# Patient Record
Sex: MALE | Race: WHITE | NOT HISPANIC OR LATINO | Employment: FULL TIME | RURAL
[De-identification: names, ages, dates, MRNs, and addresses within clinical notes are randomized per-mention and may not be internally consistent; named-entity substitution may affect disease eponyms.]

---

## 2021-08-23 DIAGNOSIS — Z11.59 SCREENING FOR VIRAL DISEASE: Primary | ICD-10-CM

## 2021-08-24 RX ORDER — TIZANIDINE 4 MG/1
4 TABLET ORAL EVERY 8 HOURS PRN
COMMUNITY

## 2021-08-24 RX ORDER — ESCITALOPRAM OXALATE 20 MG/1
20 TABLET ORAL DAILY
Status: ON HOLD | COMMUNITY
Start: 2021-07-20 | End: 2022-01-04

## 2021-08-24 RX ORDER — BUPRENORPHINE 15 UG/H
1 PATCH TRANSDERMAL
COMMUNITY
Start: 2021-08-19

## 2021-08-24 RX ORDER — OXYCODONE AND ACETAMINOPHEN 7.5; 325 MG/1; MG/1
1 TABLET ORAL 3 TIMES DAILY PRN
COMMUNITY
Start: 2021-08-23

## 2021-08-25 ENCOUNTER — ANESTHESIA (OUTPATIENT)
Dept: PAIN MEDICINE | Facility: HOSPITAL | Age: 46
End: 2021-08-25
Payer: COMMERCIAL

## 2021-08-25 ENCOUNTER — ANESTHESIA EVENT (OUTPATIENT)
Dept: PAIN MEDICINE | Facility: HOSPITAL | Age: 46
End: 2021-08-25
Payer: COMMERCIAL

## 2021-08-25 ENCOUNTER — HOSPITAL ENCOUNTER (OUTPATIENT)
Facility: HOSPITAL | Age: 46
Discharge: HOME | End: 2021-08-25
Attending: ANESTHESIOLOGY | Admitting: ANESTHESIOLOGY
Payer: COMMERCIAL

## 2021-08-25 VITALS
HEIGHT: 70 IN | HEART RATE: 73 BPM | HEART RATE: 80 BPM | DIASTOLIC BLOOD PRESSURE: 83 MMHG | OXYGEN SATURATION: 94 % | SYSTOLIC BLOOD PRESSURE: 136 MMHG | RESPIRATION RATE: 16 BRPM | SYSTOLIC BLOOD PRESSURE: 142 MMHG | OXYGEN SATURATION: 100 % | BODY MASS INDEX: 30.21 KG/M2 | DIASTOLIC BLOOD PRESSURE: 86 MMHG | TEMPERATURE: 99 F | WEIGHT: 211 LBS

## 2021-08-25 DIAGNOSIS — M47.816 LUMBAR SPONDYLOSIS: ICD-10-CM

## 2021-08-25 PROCEDURE — 25000003 PHARM REV CODE 250: Performed by: ANESTHESIOLOGY

## 2021-08-25 PROCEDURE — 63600175 PHARM REV CODE 636 W HCPCS: Performed by: ANESTHESIOLOGY

## 2021-08-25 PROCEDURE — 25000003 PHARM REV CODE 250: Performed by: NURSE ANESTHETIST, CERTIFIED REGISTERED

## 2021-08-25 PROCEDURE — 27201423 OPTIME MED/SURG SUP & DEVICES STERILE SUPPLY: Performed by: ANESTHESIOLOGY

## 2021-08-25 PROCEDURE — 27000716 HC OXISENSOR PROBE, ANY SIZE: Performed by: NURSE ANESTHETIST, CERTIFIED REGISTERED

## 2021-08-25 PROCEDURE — 64636 DESTROY L/S FACET JNT ADDL: CPT | Mod: 50,59,GZ | Performed by: ANESTHESIOLOGY

## 2021-08-25 PROCEDURE — 37000008 HC ANESTHESIA 1ST 15 MINUTES: Performed by: ANESTHESIOLOGY

## 2021-08-25 PROCEDURE — D9220A PRA ANESTHESIA: Mod: ,,, | Performed by: NURSE ANESTHETIST, CERTIFIED REGISTERED

## 2021-08-25 PROCEDURE — 64635 DESTROY LUMB/SAC FACET JNT: CPT | Mod: 50 | Performed by: ANESTHESIOLOGY

## 2021-08-25 PROCEDURE — 27000284 HC CANNULA NASAL: Performed by: NURSE ANESTHETIST, CERTIFIED REGISTERED

## 2021-08-25 PROCEDURE — 37000009 HC ANESTHESIA EA ADD 15 MINS: Performed by: ANESTHESIOLOGY

## 2021-08-25 PROCEDURE — 63600175 PHARM REV CODE 636 W HCPCS: Performed by: NURSE ANESTHETIST, CERTIFIED REGISTERED

## 2021-08-25 PROCEDURE — D9220A PRA ANESTHESIA: ICD-10-PCS | Mod: ,,, | Performed by: NURSE ANESTHETIST, CERTIFIED REGISTERED

## 2021-08-25 RX ORDER — ORPHENADRINE CITRATE 30 MG/ML
INJECTION INTRAMUSCULAR; INTRAVENOUS
Status: DISCONTINUED | OUTPATIENT
Start: 2021-08-25 | End: 2021-08-25

## 2021-08-25 RX ORDER — SODIUM CHLORIDE 9 MG/ML
INJECTION, SOLUTION INTRAVENOUS CONTINUOUS
Status: DISCONTINUED | OUTPATIENT
Start: 2021-08-25 | End: 2021-08-25 | Stop reason: HOSPADM

## 2021-08-25 RX ORDER — BUPIVACAINE HYDROCHLORIDE 2.5 MG/ML
INJECTION, SOLUTION INFILTRATION; PERINEURAL
Status: DISCONTINUED | OUTPATIENT
Start: 2021-08-25 | End: 2021-08-25 | Stop reason: HOSPADM

## 2021-08-25 RX ORDER — PROPOFOL 10 MG/ML
VIAL (ML) INTRAVENOUS
Status: DISCONTINUED | OUTPATIENT
Start: 2021-08-25 | End: 2021-08-25

## 2021-08-25 RX ORDER — TRIAMCINOLONE ACETONIDE 40 MG/ML
INJECTION, SUSPENSION INTRA-ARTICULAR; INTRAMUSCULAR
Status: DISCONTINUED | OUTPATIENT
Start: 2021-08-25 | End: 2021-08-25 | Stop reason: HOSPADM

## 2021-08-25 RX ORDER — LIDOCAINE HYDROCHLORIDE 20 MG/ML
INJECTION, SOLUTION EPIDURAL; INFILTRATION; INTRACAUDAL; PERINEURAL
Status: DISCONTINUED | OUTPATIENT
Start: 2021-08-25 | End: 2021-08-25

## 2021-08-25 RX ADMIN — PROPOFOL 100 MG: 10 INJECTION, EMULSION INTRAVENOUS at 08:08

## 2021-08-25 RX ADMIN — SODIUM CHLORIDE: 9 INJECTION, SOLUTION INTRAVENOUS at 08:08

## 2021-08-25 RX ADMIN — ORPHENADRINE CITRATE 60 MG: 30 INJECTION INTRAMUSCULAR; INTRAVENOUS at 08:08

## 2021-08-25 RX ADMIN — LIDOCAINE HYDROCHLORIDE 80 MG: 20 INJECTION, SOLUTION INTRAVENOUS at 08:08

## 2021-08-25 RX ADMIN — PROPOFOL 50 MG: 10 INJECTION, EMULSION INTRAVENOUS at 08:08

## 2021-11-24 ENCOUNTER — OFFICE VISIT (OUTPATIENT)
Dept: OTOLARYNGOLOGY | Facility: CLINIC | Age: 46
End: 2021-11-24
Payer: COMMERCIAL

## 2021-11-24 VITALS — WEIGHT: 211 LBS | BODY MASS INDEX: 30.21 KG/M2 | HEIGHT: 70 IN

## 2021-11-24 DIAGNOSIS — J34.2 DEVIATED SEPTUM: Primary | ICD-10-CM

## 2021-11-24 DIAGNOSIS — J32.9 CHRONIC SINUSITIS, UNSPECIFIED LOCATION: ICD-10-CM

## 2021-11-24 PROCEDURE — 99204 PR OFFICE/OUTPT VISIT, NEW, LEVL IV, 45-59 MIN: ICD-10-PCS | Mod: S$PBB,,, | Performed by: OTOLARYNGOLOGY

## 2021-11-24 PROCEDURE — 99204 OFFICE O/P NEW MOD 45 MIN: CPT | Mod: S$PBB,,, | Performed by: OTOLARYNGOLOGY

## 2021-11-24 PROCEDURE — 99213 OFFICE O/P EST LOW 20 MIN: CPT | Mod: PBBFAC | Performed by: OTOLARYNGOLOGY

## 2021-11-29 ENCOUNTER — OFFICE VISIT (OUTPATIENT)
Dept: OTOLARYNGOLOGY | Facility: CLINIC | Age: 46
End: 2021-11-29
Payer: COMMERCIAL

## 2021-11-29 ENCOUNTER — HOSPITAL ENCOUNTER (OUTPATIENT)
Dept: RADIOLOGY | Facility: HOSPITAL | Age: 46
Discharge: HOME | End: 2021-11-29
Attending: NURSE PRACTITIONER
Payer: COMMERCIAL

## 2021-11-29 VITALS — WEIGHT: 211 LBS | HEIGHT: 70 IN | BODY MASS INDEX: 30.21 KG/M2

## 2021-11-29 DIAGNOSIS — J32.9 CHRONIC SINUSITIS, UNSPECIFIED LOCATION: ICD-10-CM

## 2021-11-29 DIAGNOSIS — J34.2 DEVIATED NASAL SEPTUM: Primary | ICD-10-CM

## 2021-11-29 PROCEDURE — 99214 PR OFFICE/OUTPT VISIT, EST, LEVL IV, 30-39 MIN: ICD-10-PCS | Mod: S$PBB,,, | Performed by: OTOLARYNGOLOGY

## 2021-11-29 PROCEDURE — 70486 CT SINUSES WITHOUT CONTRAST: ICD-10-PCS | Mod: 26,,, | Performed by: RADIOLOGY

## 2021-11-29 PROCEDURE — 70486 CT MAXILLOFACIAL W/O DYE: CPT | Mod: TC

## 2021-11-29 PROCEDURE — 99213 OFFICE O/P EST LOW 20 MIN: CPT | Mod: PBBFAC,25 | Performed by: OTOLARYNGOLOGY

## 2021-11-29 PROCEDURE — 99214 OFFICE O/P EST MOD 30 MIN: CPT | Mod: S$PBB,,, | Performed by: OTOLARYNGOLOGY

## 2021-11-29 PROCEDURE — 70486 CT MAXILLOFACIAL W/O DYE: CPT | Mod: 26,,, | Performed by: RADIOLOGY

## 2021-12-28 ENCOUNTER — TELEPHONE (OUTPATIENT)
Dept: OTOLARYNGOLOGY | Facility: CLINIC | Age: 46
End: 2021-12-28
Payer: COMMERCIAL

## 2022-01-03 RX ORDER — SERTRALINE HYDROCHLORIDE 50 MG/1
50 TABLET, FILM COATED ORAL DAILY
COMMUNITY
Start: 2021-10-21

## 2022-01-03 RX ORDER — TESTOSTERONE CYPIONATE 1000 MG/10ML
1000 INJECTION, SOLUTION INTRAMUSCULAR WEEKLY
COMMUNITY
Start: 2021-10-27

## 2022-01-03 RX ORDER — BUSPIRONE HYDROCHLORIDE 7.5 MG/1
7.5 TABLET ORAL 2 TIMES DAILY
COMMUNITY
Start: 2021-10-21

## 2022-01-03 RX ORDER — ASPIRIN 325 MG
TABLET, DELAYED RELEASE (ENTERIC COATED) ORAL WEEKLY
COMMUNITY
Start: 2021-10-27

## 2022-01-03 RX ORDER — ROSUVASTATIN CALCIUM 10 MG/1
10 TABLET, COATED ORAL NIGHTLY
COMMUNITY
Start: 2021-10-27

## 2022-01-04 ENCOUNTER — ANESTHESIA EVENT (OUTPATIENT)
Dept: SURGERY | Facility: HOSPITAL | Age: 47
End: 2022-01-04
Payer: COMMERCIAL

## 2022-01-04 ENCOUNTER — ANESTHESIA (OUTPATIENT)
Dept: SURGERY | Facility: HOSPITAL | Age: 47
End: 2022-01-04
Payer: COMMERCIAL

## 2022-01-04 ENCOUNTER — HOSPITAL ENCOUNTER (OUTPATIENT)
Facility: HOSPITAL | Age: 47
Discharge: HOME | End: 2022-01-04
Attending: OTOLARYNGOLOGY | Admitting: OTOLARYNGOLOGY
Payer: COMMERCIAL

## 2022-01-04 VITALS
SYSTOLIC BLOOD PRESSURE: 141 MMHG | BODY MASS INDEX: 30.35 KG/M2 | HEART RATE: 87 BPM | DIASTOLIC BLOOD PRESSURE: 88 MMHG | OXYGEN SATURATION: 95 % | WEIGHT: 212 LBS | RESPIRATION RATE: 16 BRPM | HEIGHT: 70 IN | TEMPERATURE: 98 F

## 2022-01-04 DIAGNOSIS — S02.2XXA NASAL BONE FRACTURE: ICD-10-CM

## 2022-01-04 DIAGNOSIS — I10 HYPERTENSION: ICD-10-CM

## 2022-01-04 DIAGNOSIS — J34.2 DEVIATED SEPTUM: Primary | ICD-10-CM

## 2022-01-04 PROCEDURE — 27000716 HC OXISENSOR PROBE, ANY SIZE: Performed by: ANESTHESIOLOGY

## 2022-01-04 PROCEDURE — 27000260 *HC AIRWAY ORAL: Performed by: ANESTHESIOLOGY

## 2022-01-04 PROCEDURE — 93010 EKG 12-LEAD: ICD-10-PCS | Mod: ,,, | Performed by: HOSPITALIST

## 2022-01-04 PROCEDURE — 71000033 HC RECOVERY, INTIAL HOUR: Performed by: OTOLARYNGOLOGY

## 2022-01-04 PROCEDURE — 37000008 HC ANESTHESIA 1ST 15 MINUTES: Performed by: OTOLARYNGOLOGY

## 2022-01-04 PROCEDURE — 93010 ELECTROCARDIOGRAM REPORT: CPT | Mod: ,,, | Performed by: HOSPITALIST

## 2022-01-04 PROCEDURE — D9220A PRA ANESTHESIA: ICD-10-PCS | Mod: CRNA,,, | Performed by: NURSE ANESTHETIST, CERTIFIED REGISTERED

## 2022-01-04 PROCEDURE — 27000655: Performed by: ANESTHESIOLOGY

## 2022-01-04 PROCEDURE — 71000016 HC POSTOP RECOV ADDL HR: Performed by: OTOLARYNGOLOGY

## 2022-01-04 PROCEDURE — D9220A PRA ANESTHESIA: Mod: ANES,,, | Performed by: ANESTHESIOLOGY

## 2022-01-04 PROCEDURE — 37000009 HC ANESTHESIA EA ADD 15 MINS: Performed by: OTOLARYNGOLOGY

## 2022-01-04 PROCEDURE — D9220A PRA ANESTHESIA: Mod: CRNA,,, | Performed by: NURSE ANESTHETIST, CERTIFIED REGISTERED

## 2022-01-04 PROCEDURE — 27201423 OPTIME MED/SURG SUP & DEVICES STERILE SUPPLY: Performed by: OTOLARYNGOLOGY

## 2022-01-04 PROCEDURE — 36000707: Performed by: OTOLARYNGOLOGY

## 2022-01-04 PROCEDURE — D9220A PRA ANESTHESIA: ICD-10-PCS | Mod: ANES,,, | Performed by: ANESTHESIOLOGY

## 2022-01-04 PROCEDURE — 63600175 PHARM REV CODE 636 W HCPCS: Performed by: NURSE ANESTHETIST, CERTIFIED REGISTERED

## 2022-01-04 PROCEDURE — 27000165 HC TUBE, ETT CUFFED: Performed by: ANESTHESIOLOGY

## 2022-01-04 PROCEDURE — 30520 PR REPAIR, NASAL SEPTUM: ICD-10-PCS | Mod: ,,, | Performed by: OTOLARYNGOLOGY

## 2022-01-04 PROCEDURE — 25000003 PHARM REV CODE 250: Performed by: NURSE ANESTHETIST, CERTIFIED REGISTERED

## 2022-01-04 PROCEDURE — 36000706: Performed by: OTOLARYNGOLOGY

## 2022-01-04 PROCEDURE — 27000510 HC BLANKET BAIR HUGGER ANY SIZE: Performed by: ANESTHESIOLOGY

## 2022-01-04 PROCEDURE — 71000015 HC POSTOP RECOV 1ST HR: Performed by: OTOLARYNGOLOGY

## 2022-01-04 PROCEDURE — 93005 ELECTROCARDIOGRAM TRACING: CPT

## 2022-01-04 PROCEDURE — 63600175 PHARM REV CODE 636 W HCPCS: Performed by: ANESTHESIOLOGY

## 2022-01-04 PROCEDURE — 27000689 HC BLADE LARYNGOSCOPE ANY SIZE: Performed by: ANESTHESIOLOGY

## 2022-01-04 PROCEDURE — 30520 REPAIR OF NASAL SEPTUM: CPT | Mod: ,,, | Performed by: OTOLARYNGOLOGY

## 2022-01-04 PROCEDURE — 25000003 PHARM REV CODE 250: Performed by: OTOLARYNGOLOGY

## 2022-01-04 RX ORDER — LIDOCAINE HYDROCHLORIDE AND EPINEPHRINE 10; 10 MG/ML; UG/ML
INJECTION, SOLUTION INFILTRATION; PERINEURAL
Status: DISCONTINUED | OUTPATIENT
Start: 2022-01-04 | End: 2022-01-04 | Stop reason: HOSPADM

## 2022-01-04 RX ORDER — PROPOFOL 10 MG/ML
VIAL (ML) INTRAVENOUS
Status: DISCONTINUED | OUTPATIENT
Start: 2022-01-04 | End: 2022-01-04

## 2022-01-04 RX ORDER — FENTANYL CITRATE 50 UG/ML
INJECTION, SOLUTION INTRAMUSCULAR; INTRAVENOUS
Status: DISCONTINUED | OUTPATIENT
Start: 2022-01-04 | End: 2022-01-04

## 2022-01-04 RX ORDER — HYDROMORPHONE HYDROCHLORIDE 2 MG/ML
0.5 INJECTION, SOLUTION INTRAMUSCULAR; INTRAVENOUS; SUBCUTANEOUS EVERY 5 MIN PRN
Status: DISCONTINUED | OUTPATIENT
Start: 2022-01-04 | End: 2022-01-04 | Stop reason: HOSPADM

## 2022-01-04 RX ORDER — MORPHINE SULFATE 10 MG/ML
4 INJECTION INTRAMUSCULAR; INTRAVENOUS; SUBCUTANEOUS EVERY 5 MIN PRN
Status: DISCONTINUED | OUTPATIENT
Start: 2022-01-04 | End: 2022-01-04 | Stop reason: HOSPADM

## 2022-01-04 RX ORDER — ONDANSETRON 2 MG/ML
INJECTION INTRAMUSCULAR; INTRAVENOUS
Status: DISCONTINUED | OUTPATIENT
Start: 2022-01-04 | End: 2022-01-04

## 2022-01-04 RX ORDER — HYDROCODONE BITARTRATE AND ACETAMINOPHEN 7.5; 325 MG/1; MG/1
1 TABLET ORAL EVERY 6 HOURS PRN
Status: DISCONTINUED | OUTPATIENT
Start: 2022-01-04 | End: 2022-01-04 | Stop reason: HOSPADM

## 2022-01-04 RX ORDER — LIDOCAINE HYDROCHLORIDE 10 MG/ML
1 INJECTION INFILTRATION; PERINEURAL ONCE
Status: DISCONTINUED | OUTPATIENT
Start: 2022-01-04 | End: 2022-01-04 | Stop reason: HOSPADM

## 2022-01-04 RX ORDER — LIDOCAINE HYDROCHLORIDE 10 MG/ML
1 INJECTION INFILTRATION; PERINEURAL ONCE AS NEEDED
Status: DISCONTINUED | OUTPATIENT
Start: 2022-01-04 | End: 2022-01-04 | Stop reason: HOSPADM

## 2022-01-04 RX ORDER — MIDAZOLAM HYDROCHLORIDE 1 MG/ML
INJECTION INTRAMUSCULAR; INTRAVENOUS
Status: DISCONTINUED | OUTPATIENT
Start: 2022-01-04 | End: 2022-01-04

## 2022-01-04 RX ORDER — DEXAMETHASONE SODIUM PHOSPHATE 4 MG/ML
INJECTION, SOLUTION INTRA-ARTICULAR; INTRALESIONAL; INTRAMUSCULAR; INTRAVENOUS; SOFT TISSUE
Status: DISCONTINUED | OUTPATIENT
Start: 2022-01-04 | End: 2022-01-04

## 2022-01-04 RX ORDER — LIDOCAINE HYDROCHLORIDE 20 MG/ML
INJECTION, SOLUTION EPIDURAL; INFILTRATION; INTRACAUDAL; PERINEURAL
Status: DISCONTINUED | OUTPATIENT
Start: 2022-01-04 | End: 2022-01-04

## 2022-01-04 RX ORDER — MEPERIDINE HYDROCHLORIDE 25 MG/ML
25 INJECTION INTRAMUSCULAR; INTRAVENOUS; SUBCUTANEOUS EVERY 10 MIN PRN
Status: DISCONTINUED | OUTPATIENT
Start: 2022-01-04 | End: 2022-01-04 | Stop reason: HOSPADM

## 2022-01-04 RX ORDER — ROCURONIUM BROMIDE 10 MG/ML
INJECTION, SOLUTION INTRAVENOUS
Status: DISCONTINUED | OUTPATIENT
Start: 2022-01-04 | End: 2022-01-04

## 2022-01-04 RX ORDER — DIPHENHYDRAMINE HYDROCHLORIDE 50 MG/ML
25 INJECTION INTRAMUSCULAR; INTRAVENOUS EVERY 6 HOURS PRN
Status: DISCONTINUED | OUTPATIENT
Start: 2022-01-04 | End: 2022-01-04 | Stop reason: HOSPADM

## 2022-01-04 RX ORDER — ONDANSETRON 2 MG/ML
4 INJECTION INTRAMUSCULAR; INTRAVENOUS DAILY PRN
Status: DISCONTINUED | OUTPATIENT
Start: 2022-01-04 | End: 2022-01-04 | Stop reason: HOSPADM

## 2022-01-04 RX ORDER — SODIUM CHLORIDE, SODIUM LACTATE, POTASSIUM CHLORIDE, CALCIUM CHLORIDE 600; 310; 30; 20 MG/100ML; MG/100ML; MG/100ML; MG/100ML
INJECTION, SOLUTION INTRAVENOUS CONTINUOUS
Status: DISCONTINUED | OUTPATIENT
Start: 2022-01-04 | End: 2022-01-04 | Stop reason: HOSPADM

## 2022-01-04 RX ADMIN — DEXAMETHASONE SODIUM PHOSPHATE 16 MG: 4 INJECTION, SOLUTION INTRA-ARTICULAR; INTRALESIONAL; INTRAMUSCULAR; INTRAVENOUS; SOFT TISSUE at 09:01

## 2022-01-04 RX ADMIN — SODIUM CHLORIDE, POTASSIUM CHLORIDE, SODIUM LACTATE AND CALCIUM CHLORIDE: 600; 310; 30; 20 INJECTION, SOLUTION INTRAVENOUS at 09:01

## 2022-01-04 RX ADMIN — ROCURONIUM BROMIDE 35 MG: 10 INJECTION, SOLUTION INTRAVENOUS at 09:01

## 2022-01-04 RX ADMIN — MIDAZOLAM 2 MG: 1 INJECTION INTRAMUSCULAR; INTRAVENOUS at 09:01

## 2022-01-04 RX ADMIN — SUGAMMADEX 200 MG: 100 INJECTION, SOLUTION INTRAVENOUS at 09:01

## 2022-01-04 RX ADMIN — PROPOFOL 155 MG: 10 INJECTION, EMULSION INTRAVENOUS at 09:01

## 2022-01-04 RX ADMIN — ONDANSETRON 4 MG: 2 INJECTION INTRAMUSCULAR; INTRAVENOUS at 09:01

## 2022-01-04 RX ADMIN — FENTANYL CITRATE 100 MCG: 50 INJECTION INTRAMUSCULAR; INTRAVENOUS at 09:01

## 2022-01-04 RX ADMIN — LIDOCAINE HYDROCHLORIDE 100 MG: 20 INJECTION, SOLUTION INTRAVENOUS at 09:01

## 2022-01-04 NOTE — OP NOTE
After general ET anesthesia the patient was decongested with neosynephrine soaked patties also the septum was injected with lidocaine 1% with 1:100,000 epi around 3 cc a right meron transfixion incision was made on the right side and a mucoperichondral flap was raised the deviated portions of the cartilage were removed leaving adequate support septal cohaptation sutures were placed with 3 0 Chromic and closing also the incision   There was no further bleeding the patient was reversed taken to  in stable condition.

## 2022-01-04 NOTE — ANESTHESIA PROCEDURE NOTES
Intubation    Date/Time: 1/4/2022 9:20 AM  Performed by: Ismael Arnold CRNA  Authorized by: Ricardo Chicas MD     Intubation:     Induction:  Intravenous    Intubated:  Postinduction    Mask Ventilation:  Easy mask    Attempts:  1    Attempted By:  CRNA    Method of Intubation:  Direct    Blade:  Carolyn 4    Laryngeal View Grade: Grade I - full view of cords      Difficult Airway Encountered?: No      Complications:  None    Airway Device:  Oral endotracheal tube    Airway Device Size:  7.0    Style/Cuff Inflation:  Cuffed    Inflation Amount (mL):  7    Tube secured:  21    Placement Verified By:  Capnometry    Complicating Factors:  None    Findings Post-Intubation:  BS equal bilateral and atraumatic/condition of teeth unchanged

## 2022-01-04 NOTE — H&P
Patient ID: Roberto Rubio is a 46 y.o. male.     Chief Complaint: Follow-up (CT sinus. Septal deviation)     HPI  Review of Systems   HENT: Positive for congestion, sinus pressure and sinus pain.    All other systems reviewed and are negative.      Objective:   Physical Exam  General: NAD  Head: Normocephalic, atraumatic, no facial asymmetry/normal strength,  Ears: Both auricules normal in appearance, w/o deformities tympanic membranes normal external auditory canals normal  Nose: External nose w/o deformities normal turbinates no drainage or inflammation septum to left  With spur on right posteriorly  Oral Cavity: Lips, gums, floor of mouth, tongue hard palate, and buccal mucosa without mass/lesion  Oropharynx: Mucosa pink and moist, soft palate, posterior pharynx and oropharyngeal wall without mass/lesion  Neck: Supple, symmetric, trachea midline, no palpable mass/lesion, no palpable cervical lymphadenopathy  Skin: Warm and dry, no concerning lesions  Respiratory: Respirations even, unlabored     Assessment:       1. Deviated nasal septum        Plan:     discussed sinus CT with pt   Septoplasty

## 2022-01-04 NOTE — ANESTHESIA POSTPROCEDURE EVALUATION
Anesthesia Post Evaluation    Patient: Roberto Rubio    Procedure(s) Performed: Procedure(s) (LRB):  SEPTOPLASTY, NOSE (N/A)    Final Anesthesia Type: general      Patient location during evaluation: PACU  Patient participation: Yes- Able to Participate  Level of consciousness: awake  Post-procedure vital signs: reviewed and stable  Pain management: adequate  Airway patency: patent  HARDY mitigation strategies: Extubation and recovery carried out in lateral, semiupright, or other nonsupine position  PONV status at discharge: No PONV  Anesthetic complications: no      Cardiovascular status: hemodynamically stable  Respiratory status: unassisted  Hydration status: euvolemic  Follow-up not needed.          Vitals Value Taken Time   /84 01/04/22 1019   Temp 36.1 °C (97 °F) 01/04/22 0949   Pulse 80 01/04/22 1024   Resp 14 01/04/22 1024   SpO2 94 % 01/04/22 1024   Vitals shown include unvalidated device data.      No case tracking events are documented in the log.      Pain/Mejia Score: Mejia Score: 10 (1/4/2022 10:21 AM)

## 2022-01-04 NOTE — TRANSFER OF CARE
"Anesthesia Transfer of Care Note    Patient: Roberto Rubio    Procedure(s) Performed: Procedure(s) (LRB):  SEPTOPLASTY, NOSE (N/A)    Patient location: PACU    Anesthesia Type: general    Transport from OR: Transported from OR on 6-10 L/min O2 by face mask with adequate spontaneous ventilation    Post pain: adequate analgesia    Post assessment: no apparent anesthetic complications    Post vital signs: stable    Level of consciousness: sedated and awake    Nausea/Vomiting: no nausea/vomiting    Complications: none    Transfer of care protocol was followed      Last vitals:   Visit Vitals  /72 (BP Location: Right arm, Patient Position: Lying)   Pulse 89   Temp 36.1 °C (97 °F)   Resp 15   Ht 5' 10" (1.778 m)   Wt 96.2 kg (212 lb)   SpO2 (!) 93%   BMI 30.42 kg/m²     "

## 2022-01-04 NOTE — ANESTHESIA PREPROCEDURE EVALUATION
01/04/2022  Roberto Rubio is a 46 y.o., male.    Anesthesia Evaluation    I have reviewed the Patient Summary Reports.   I have reviewed the NPO Status.   I have reviewed the Medications.     Review of Systems         Anesthesia Plan  Type of Anesthesia, risks & benefits discussed:  Anesthesia Type:  general    Patient's Preference:   Plan Factors:          Intra-op Monitoring Plan: standard ASA monitors  Intra-op Monitoring Plan Comments:   Post Op Pain Control Plan: IV/PO Opioids PRN  Post Op Pain Control Plan Comments:     Induction:   IV  Beta Blocker:         Informed Consent: Patient understands risks and agrees with Anesthesia plan.  Questions answered. Anesthesia consent signed with patient.  ASA Score: 2     Day of Surgery Review of History & Physical:            Ready For Surgery From Anesthesia Perspective.     NPO greater than 8 hours  NAC  NKDA    Depression Hypertension   Arthritis    H/o GI ulcer    Airway exam deferred (COVID precautions); adequate ROM at neck.

## 2022-01-04 NOTE — PROGRESS NOTES
C/O [facility] PAIN STATES #5. REQUEST PAIN MED STATING I HAVE MY OWN MEDICATION FROM PAIN [facility] AND I'LL TAKE IT. PERCOCET 7.5MG 1 PO TAKEN FROM HOME SUPPLY.

## 2022-01-04 NOTE — OR NURSING
Pt. Transferred back to [facility] via stretcher. Color pink. Resp. Easy. Head of stretcher elevated. No bleeding or drainage noted from nose or mouth. Awake and alert. Iv of lr at kvo in rt. Hand without problems. No reports fo pain. V/s stable. Condition stable.

## 2022-01-13 ENCOUNTER — OFFICE VISIT (OUTPATIENT)
Dept: OTOLARYNGOLOGY | Facility: CLINIC | Age: 47
End: 2022-01-13
Payer: COMMERCIAL

## 2022-01-13 VITALS — HEIGHT: 70 IN | WEIGHT: 212 LBS | BODY MASS INDEX: 30.35 KG/M2

## 2022-01-13 DIAGNOSIS — J34.2 DEVIATED NASAL SEPTUM: Primary | ICD-10-CM

## 2022-01-13 PROCEDURE — 3008F BODY MASS INDEX DOCD: CPT | Mod: ,,, | Performed by: OTOLARYNGOLOGY

## 2022-01-13 PROCEDURE — 1160F PR REVIEW ALL MEDS BY PRESCRIBER/CLIN PHARMACIST DOCUMENTED: ICD-10-PCS | Mod: ,,, | Performed by: OTOLARYNGOLOGY

## 2022-01-13 PROCEDURE — 3008F PR BODY MASS INDEX (BMI) DOCUMENTED: ICD-10-PCS | Mod: ,,, | Performed by: OTOLARYNGOLOGY

## 2022-01-13 PROCEDURE — 1160F RVW MEDS BY RX/DR IN RCRD: CPT | Mod: ,,, | Performed by: OTOLARYNGOLOGY

## 2022-01-13 PROCEDURE — 1159F PR MEDICATION LIST DOCUMENTED IN MEDICAL RECORD: ICD-10-PCS | Mod: ,,, | Performed by: OTOLARYNGOLOGY

## 2022-01-13 PROCEDURE — 99213 OFFICE O/P EST LOW 20 MIN: CPT | Mod: PBBFAC | Performed by: OTOLARYNGOLOGY

## 2022-01-13 PROCEDURE — 1159F MED LIST DOCD IN RCRD: CPT | Mod: ,,, | Performed by: OTOLARYNGOLOGY

## 2022-01-13 PROCEDURE — 99024 POSTOP FOLLOW-UP VISIT: CPT | Mod: ,,, | Performed by: OTOLARYNGOLOGY

## 2022-01-13 PROCEDURE — 99024 PR POST-OP FOLLOW-UP VISIT: ICD-10-PCS | Mod: ,,, | Performed by: OTOLARYNGOLOGY

## 2022-01-13 NOTE — PROGRESS NOTES
Subjective:       Patient ID: Roberto Rubio is a 46 y.o. male.    Chief Complaint: Follow-up (10 day post-op septoplasty. Pt states nose and top of mouth is tender to touch. )    HPI  Review of Systems    Objective:      Physical Exam  healing well smell improved   Assessment:       1. Deviated nasal septum        Plan:       F/u 6 weeks

## 2022-03-21 ENCOUNTER — OFFICE VISIT (OUTPATIENT)
Dept: DERMATOLOGY | Facility: CLINIC | Age: 47
End: 2022-03-21
Payer: COMMERCIAL

## 2022-03-21 DIAGNOSIS — D22.9 MULTIPLE BENIGN NEVI: ICD-10-CM

## 2022-03-21 DIAGNOSIS — L82.1 SEBORRHEIC KERATOSES: ICD-10-CM

## 2022-03-21 DIAGNOSIS — L57.8 OTHER SKIN CHANGES DUE TO CHRONIC EXPOSURE TO NONIONIZING RADIATION: Primary | ICD-10-CM

## 2022-03-21 DIAGNOSIS — Z85.820 HISTORY OF MELANOMA: ICD-10-CM

## 2022-03-21 DIAGNOSIS — H02.60 XANTHELASMA: ICD-10-CM

## 2022-03-21 PROCEDURE — 1160F RVW MEDS BY RX/DR IN RCRD: CPT | Mod: ,,, | Performed by: DERMATOLOGY

## 2022-03-21 PROCEDURE — 1160F PR REVIEW ALL MEDS BY PRESCRIBER/CLIN PHARMACIST DOCUMENTED: ICD-10-PCS | Mod: ,,, | Performed by: DERMATOLOGY

## 2022-03-21 PROCEDURE — 1159F MED LIST DOCD IN RCRD: CPT | Mod: ,,, | Performed by: DERMATOLOGY

## 2022-03-21 PROCEDURE — 99203 PR OFFICE/OUTPT VISIT, NEW, LEVL III, 30-44 MIN: ICD-10-PCS | Mod: ,,, | Performed by: DERMATOLOGY

## 2022-03-21 PROCEDURE — 99203 OFFICE O/P NEW LOW 30 MIN: CPT | Mod: ,,, | Performed by: DERMATOLOGY

## 2022-03-21 PROCEDURE — 1159F PR MEDICATION LIST DOCUMENTED IN MEDICAL RECORD: ICD-10-PCS | Mod: ,,, | Performed by: DERMATOLOGY

## 2022-03-21 NOTE — PROGRESS NOTES
[facility]   Sadie Rodríguez MD    Patient Name: Roberto Rubio  Patient YOB: 1975   Date of Service: 3/21/22    CC: Full Skin Exam    HPI: Roberto Rubio is a 46 y.o. male presents today for a full skin exam.  Patient has been seen by a dermatologist in the past and dermatologic history includes hx of melanoma on the middle back in 2005. Patient is concerned today about lesions  located on the back.  It has been present for 1 year(s). It has not been treated in the past. Patient is also concerned about lesion on the left upper eyelid.    Past Medical History:   Diagnosis Date    Arthritis     Depression     Hypertension      Past Surgical History:   Procedure Laterality Date    CARPAL TUNNEL RELEASE Bilateral     NASAL SEPTOPLASTY N/A 1/4/2022    Procedure: SEPTOPLASTY, NOSE;  Surgeon: Amado Maya MD;  Location: [facility] OR;  Service: ENT;  Laterality: N/A;    RADIOFREQUENCY ABLATION OF LUMBAR MEDIAL BRANCH NERVE AT SINGLE LEVEL Bilateral 8/25/2021    Procedure: RADIOFREQUENCY ABLATION, NERVE, SPINAL, LUMBAR, MEDIAL BRANCH, 1 LEVEL;  Surgeon: Ricardo Boss MD;  Location: [facility]IN [facility];  Service: Pain Management;  Laterality: Bilateral;  Bilateral  L3-5 RFTC     Review of patient's allergies indicates:   Allergen Reactions    Nsaids (non-steroidal anti-inflammatory drug)        Current Outpatient Medications:     buprenorphine 15 mcg/hour PTWK, Apply 1 patch topically every 7 days., Disp: , Rfl:     busPIRone (BUSPAR) 7.5 MG tablet, 7.5 mg 2 (two) times a day., Disp: , Rfl:     cholecalciferol, vitamin D3, 1,250 mcg (50,000 unit) capsule, Take by mouth once a week., Disp: , Rfl:     oxyCODONE-acetaminophen (PERCOCET) 7.5-325 mg per tablet, Take 1 tablet by mouth 3 (three) times daily as needed. , Disp: , Rfl:     rosuvastatin (CRESTOR) 10 MG tablet, 10 mg every evening., Disp: , Rfl:     sertraline (ZOLOFT) 50 MG tablet, 50 mg once daily., Disp: , Rfl:      testosterone cypionate (DEPOTESTOTERONE CYPIONATE) 100 mg/mL injection, Inject 1,000 mg into the muscle once a week., Disp: , Rfl:     tiZANidine (ZANAFLEX) 4 MG tablet, Take 4 mg by mouth every 8 (eight) hours as needed., Disp: , Rfl:     ROS: A focused review of systems was obtained and negative.     Exam: A full skin exam was performed including scalp, hair, face, neck, chest, back, abdomen, right arm, left arm, right hand, left hand, nails, right leg, and left leg.  All areas examined were normal expect as per below in assessment and plan.  General Appearance of the patient is well developed and well nourished.  Orientation: alert and oriented x 3.  Mood and affect: pleasant.    Assessment:   The primary encounter diagnosis was Other skin changes due to chronic exposure to nonionizing radiation. Diagnoses of History of melanoma, Multiple benign nevi, and Seborrheic keratoses were also pertinent to this visit.    Plan:      History of malignant melanoma  - well healed scar with NER  Associated diagnosis: Medical surveillance following completed treatment    Plan: Counseling  I counseled the patient regarding the following:  Skin Care: Patients with a history of melanoma should wear broad spectrum sunscreen and sun protective clothing.  Expectations: Scars from excisional sites of melanoma should be monitored for any recurrences. Monthly self-skin checks should be performed to monitor for any moles that change in size, shape or color, itch burn or bleed.  Contact Office if: Patient notices any new or changing moles, develops constitutional symptoms or develops new lesions within or around the previous melanoma scar.    Other Skin Changes Due to Chronic Exposure of Nonionizing Radiation (L57.8)    Plan: Monitoring.     Plan: Sunscreen Recommendations.  I recommended a broad spectrum sunscreen with a SPF of 30 or higher. I explained that SPF 30 sunscreens block approximately 97 percent of the  sun's harmful rays.  Sunscreens should be applied at least 15 minutes prior to expected sun exposure and then every 2 hours after that as long as  sun exposure continues. If swimming or exercising sunscreen should be reapplied every 45 minutes to an hour after getting wet or sweating. One  ounce, or the equivalent of a shot glass full of sunscreen, is adequate to protect the skin not covered by a bathing suit. I also recommended a lip  balm with a sunscreen as well. Sun protective clothing can be used in lieu of sunscreen but must be worn the entire time you are exposed to the  sun's rays.    Benign Nevus (D22.72)  - Dome shaped regular papule    Plan: Reassurance.    Plan: Counseling.  I counseled the patient regarding the following:  Instructions: Monthly self-skin checks to monitor for any changes in moles are recommended.  Expectations: Benign Nevi are pigmented nests of cells within the skin. No treatment is necessary.  Contact Office if: Any moles change in size, shape or color; itch, burn or bleed.    Seborrheic Keratosis (L82.1)  - Stuck-on, warty, greasy brown papule with pseudo-horn cysts scattered on the trunk and extremities    Plan: Counseling.  I counseled the patient regarding the following:  Skin Care: Seborrheic Keratoses are benign. No treatment is necessary.  Expectations: Seborrheic Keratoses are benign warty growths. Patients get more of them as they age    Plan: Reassurance    Xanthelasma   - yellow plaque on the left upper eyelid    Reassurance     Follow up in about 1 year (around 3/21/2023).    Sadie Rodríguez MD

## 2023-05-04 NOTE — OR NURSING
0947- received pt. To pacu via stretcher. Color pink. Resp. Easy. Head of stretcher elevated. Awake and talking. Iv fluids of lr at kvo in right hand without problems. No reports of pain. Connected to monitors.     1000- pt. Resting quietly on stretcher. Eyes closed. Resp. Easy. Continuing to monitor.       24

## 2023-05-17 ENCOUNTER — OFFICE VISIT (OUTPATIENT)
Dept: DERMATOLOGY | Facility: CLINIC | Age: 48
End: 2023-05-17
Payer: COMMERCIAL

## 2023-05-17 DIAGNOSIS — D22.9 BENIGN NEVUS: ICD-10-CM

## 2023-05-17 DIAGNOSIS — L57.8 OTHER SKIN CHANGES DUE TO CHRONIC EXPOSURE TO NONIONIZING RADIATION: Primary | ICD-10-CM

## 2023-05-17 DIAGNOSIS — Z85.820 HISTORY OF MELANOMA: ICD-10-CM

## 2023-05-17 DIAGNOSIS — L82.1 SK (SEBORRHEIC KERATOSIS): ICD-10-CM

## 2023-05-17 PROCEDURE — 1159F PR MEDICATION LIST DOCUMENTED IN MEDICAL RECORD: ICD-10-PCS | Mod: ,,, | Performed by: DERMATOLOGY

## 2023-05-17 PROCEDURE — 1160F PR REVIEW ALL MEDS BY PRESCRIBER/CLIN PHARMACIST DOCUMENTED: ICD-10-PCS | Mod: ,,, | Performed by: DERMATOLOGY

## 2023-05-17 PROCEDURE — 1160F RVW MEDS BY RX/DR IN RCRD: CPT | Mod: ,,, | Performed by: DERMATOLOGY

## 2023-05-17 PROCEDURE — 1159F MED LIST DOCD IN RCRD: CPT | Mod: ,,, | Performed by: DERMATOLOGY

## 2023-05-17 PROCEDURE — 99213 PR OFFICE/OUTPT VISIT, EST, LEVL III, 20-29 MIN: ICD-10-PCS | Mod: ,,, | Performed by: DERMATOLOGY

## 2023-05-17 PROCEDURE — 99213 OFFICE O/P EST LOW 20 MIN: CPT | Mod: ,,, | Performed by: DERMATOLOGY

## 2023-05-17 NOTE — PATIENT INSTRUCTIONS
Sunscreen Recommendations  I recommended a broad spectrum sunscreen with a SPF of 30 or higher that is water-resistant. SPF 30 sunscreens block approximately 97 percent of the sun's harmful rays.   Sunscreens should be applied at least 15 minutes prior to expected sun exposure and then every 90 minutes after that as long as sun exposure continues.   If swimming or exercising sunscreen should be reapplied every 45 minutes to an hour after getting wet or sweating.  One ounce, or the equivalent of a shot glass full of sunscreen, is adequate to protect the skin not covered by a bathing suit.   I also recommended a lip balm with a sunscreen as well.   Healthy Sun Protective Behaviors  Sun protective clothing can be used in lieu of sunscreen but must be worn the entire time you are exposed to the sun's rays.  Seek shade between 10 a.m. and 2 p.m.  Use extra caution near water, snow, or sand as they reflect sun rays  Avoid tanning beds and consider sunless self-tanning products instead  Perform monthly self skin exams    The ABCDEs of Melanoma  Asymmetry - when one side is unlike the other  Border - irregular  Color - different shades of colors that can be black, brown, tan, white, red, grey, or blue  Diameter - as big as or larger than the size of a pencil eraser (6mm)  Evolving - changing in size, color, or shape or stands out from the rest of your moles

## 2023-05-17 NOTE — PROGRESS NOTES
[facility]   Sadie Rodríguez MD    Patient Name: Roberto Rubio  Patient YOB: 1975   Date of Service: 5/17/23    CC: Full Skin Exam    HPI: Roberto Rubio is a 48 y.o. male presents today for a full skin exam.  Patient was last seen 03/22 and dermatologic history includes Melanoma. Patient is concerned today about a lesion located on the right posterior shoulder.  It has been present for 2 year(s). It has not been treated in the past.    Past Medical History:   Diagnosis Date    Arthritis     Depression     Hypertension     Malignant melanoma of skin, unspecified     Right interscapular back- 10/2005 0.25mm     Past Surgical History:   Procedure Laterality Date    CARPAL TUNNEL RELEASE Bilateral     NASAL SEPTOPLASTY N/A 1/4/2022    Procedure: SEPTOPLASTY, NOSE;  Surgeon: Amado Maya MD;  Location: [facility] OR;  Service: ENT;  Laterality: N/A;    RADIOFREQUENCY ABLATION OF LUMBAR MEDIAL BRANCH NERVE AT SINGLE LEVEL Bilateral 8/25/2021    Procedure: RADIOFREQUENCY ABLATION, NERVE, SPINAL, LUMBAR, MEDIAL BRANCH, 1 LEVEL;  Surgeon: Ricardo Boss MD;  Location: [facility];  Service: Pain Management;  Laterality: Bilateral;  Bilateral  L3-5 RFTC     Review of patient's allergies indicates:   Allergen Reactions    Nsaids (non-steroidal anti-inflammatory drug)        Current Outpatient Medications:     buprenorphine 15 mcg/hour PTWK, Apply 1 patch topically every 7 days., Disp: , Rfl:     busPIRone (BUSPAR) 7.5 MG tablet, 7.5 mg 2 (two) times a day., Disp: , Rfl:     cholecalciferol, vitamin D3, 1,250 mcg (50,000 unit) capsule, Take by mouth once a week., Disp: , Rfl:     oxyCODONE-acetaminophen (PERCOCET) 7.5-325 mg per tablet, Take 1 tablet by mouth 3 (three) times daily as needed. , Disp: , Rfl:     rosuvastatin (CRESTOR) 10 MG tablet, 10 mg every evening., Disp: , Rfl:     sertraline (ZOLOFT) 50 MG tablet, 50 mg once daily., Disp: , Rfl:     testosterone cypionate  (DEPOTESTOTERONE CYPIONATE) 100 mg/mL injection, Inject 1,000 mg into the muscle once a week., Disp: , Rfl:     tiZANidine (ZANAFLEX) 4 MG tablet, Take 4 mg by mouth every 8 (eight) hours as needed., Disp: , Rfl:     ROS: A focused review of systems was obtained and negative.     Exam: A full skin exam was performed including scalp, hair, face, neck, chest, back, abdomen, right arm, left arm, right hand, left hand, nails, right leg, and left leg.  All areas examined were normal expect as per below in assessment and plan.  General Appearance of the patient is well developed and well nourished.  Orientation: alert and oriented x 3.  Mood and affect: pleasant.    Assessment:   The primary encounter diagnosis was Other skin changes due to chronic exposure to nonionizing radiation. Diagnoses of SK (seborrheic keratosis), History of melanoma, and Benign nevus were also pertinent to this visit.    Plan:        Seborrheic Keratosis (L82.1)  - Stuck-on, warty, greasy brown papule with pseudo-horn cysts scattered on the trunk and extremities    Plan: Counseling.  I counseled the patient regarding the following:  Skin Care: Seborrheic Keratoses are benign. No treatment is necessary.  Expectations: Seborrheic Keratoses are benign warty growths. Patients get more of them as they age    Plan: Reassurance      Benign Nevus (D22.72)  - Dome shaped regular papule    Plan: Reassurance.    Plan: Counseling.  I counseled the patient regarding the following:  Instructions: Monthly self-skin checks to monitor for any changes in moles are recommended.  Expectations: Benign Nevi are pigmented nests of cells within the skin. No treatment is necessary.  Contact Office if: Any moles change in size, shape or color; itch, burn or bleed.      History of malignant melanoma  - well healed scar with NER  Associated diagnosis: Medical surveillance following completed treatment    Plan: Counseling  I counseled the patient regarding the  following:  Skin Care: Patients with a history of melanoma should wear broad spectrum sunscreen and sun protective clothing.  Expectations: Scars from excisional sites of melanoma should be monitored for any recurrences. Monthly self-skin checks should be performed to monitor for any moles that change in size, shape or color, itch burn or bleed.  Contact Office if: Patient notices any new or changing moles, develops constitutional symptoms or develops new lesions within or around the previous melanoma scar.    Other Skin Changes Due to Chronic Exposure of Nonionizing Radiation (L57.8)    Plan: Monitoring.     Plan: Sunscreen Recommendations.  I recommended a broad spectrum sunscreen with a SPF of 30 or higher. I explained that SPF 30 sunscreens block approximately 97 percent of the  sun's harmful rays. Sunscreens should be applied at least 15 minutes prior to expected sun exposure and then every 2 hours after that as long as  sun exposure continues. If swimming or exercising sunscreen should be reapplied every 45 minutes to an hour after getting wet or sweating. One  ounce, or the equivalent of a shot glass full of sunscreen, is adequate to protect the skin not covered by a bathing suit. I also recommended a lip  balm with a sunscreen as well. Sun protective clothing can be used in lieu of sunscreen but must be worn the entire time you are exposed to the  sun's rays.    Follow up in about 1 year (around 5/17/2024) for FSE.    Sadie Rodríguez MD

## 2024-04-24 ENCOUNTER — ANESTHESIA EVENT (OUTPATIENT)
Dept: PAIN MEDICINE | Facility: HOSPITAL | Age: 49
End: 2024-04-24
Payer: COMMERCIAL

## 2024-04-24 ENCOUNTER — HOSPITAL ENCOUNTER (OUTPATIENT)
Facility: HOSPITAL | Age: 49
Discharge: HOME | End: 2024-04-24
Attending: ANESTHESIOLOGY | Admitting: ANESTHESIOLOGY
Payer: COMMERCIAL

## 2024-04-24 ENCOUNTER — ANESTHESIA (OUTPATIENT)
Dept: PAIN MEDICINE | Facility: HOSPITAL | Age: 49
End: 2024-04-24
Payer: COMMERCIAL

## 2024-04-24 VITALS
RESPIRATION RATE: 15 BRPM | OXYGEN SATURATION: 96 % | TEMPERATURE: 97 F | BODY MASS INDEX: 31.78 KG/M2 | DIASTOLIC BLOOD PRESSURE: 64 MMHG | WEIGHT: 222 LBS | HEIGHT: 70 IN | HEART RATE: 60 BPM | SYSTOLIC BLOOD PRESSURE: 104 MMHG

## 2024-04-24 DIAGNOSIS — M47.816 LUMBAR SPONDYLOSIS: ICD-10-CM

## 2024-04-24 PROCEDURE — 25000003 PHARM REV CODE 250: Performed by: NURSE ANESTHETIST, CERTIFIED REGISTERED

## 2024-04-24 PROCEDURE — 27000284 HC CANNULA NASAL: Performed by: NURSE ANESTHETIST, CERTIFIED REGISTERED

## 2024-04-24 PROCEDURE — 25000003 PHARM REV CODE 250: Performed by: ANESTHESIOLOGY

## 2024-04-24 PROCEDURE — 37000008 HC ANESTHESIA 1ST 15 MINUTES: Performed by: ANESTHESIOLOGY

## 2024-04-24 PROCEDURE — 63600175 PHARM REV CODE 636 W HCPCS: Performed by: ANESTHESIOLOGY

## 2024-04-24 PROCEDURE — 27000716 HC OXISENSOR PROBE, ANY SIZE: Performed by: NURSE ANESTHETIST, CERTIFIED REGISTERED

## 2024-04-24 PROCEDURE — 64494 INJ PARAVERT F JNT L/S 2 LEV: CPT | Performed by: ANESTHESIOLOGY

## 2024-04-24 PROCEDURE — D9220A PRA ANESTHESIA: Mod: 23,,, | Performed by: NURSE ANESTHETIST, CERTIFIED REGISTERED

## 2024-04-24 PROCEDURE — 64493 INJ PARAVERT F JNT L/S 1 LEV: CPT | Mod: 50 | Performed by: ANESTHESIOLOGY

## 2024-04-24 PROCEDURE — 63600175 PHARM REV CODE 636 W HCPCS: Performed by: NURSE ANESTHETIST, CERTIFIED REGISTERED

## 2024-04-24 RX ORDER — BUPIVACAINE HYDROCHLORIDE 2.5 MG/ML
INJECTION, SOLUTION INFILTRATION; PERINEURAL CODE/TRAUMA/SEDATION MEDICATION
Status: DISCONTINUED | OUTPATIENT
Start: 2024-04-24 | End: 2024-04-24 | Stop reason: HOSPADM

## 2024-04-24 RX ORDER — TRIAMCINOLONE ACETONIDE 40 MG/ML
INJECTION, SUSPENSION INTRA-ARTICULAR; INTRAMUSCULAR CODE/TRAUMA/SEDATION MEDICATION
Status: DISCONTINUED | OUTPATIENT
Start: 2024-04-24 | End: 2024-04-24 | Stop reason: HOSPADM

## 2024-04-24 RX ORDER — SODIUM CHLORIDE 9 MG/ML
500 INJECTION, SOLUTION INTRAVENOUS CONTINUOUS
Status: DISCONTINUED | OUTPATIENT
Start: 2024-04-24 | End: 2024-04-24 | Stop reason: HOSPADM

## 2024-04-24 RX ORDER — LIDOCAINE HYDROCHLORIDE 20 MG/ML
INJECTION, SOLUTION EPIDURAL; INFILTRATION; INTRACAUDAL; PERINEURAL
Status: DISCONTINUED | OUTPATIENT
Start: 2024-04-24 | End: 2024-04-24

## 2024-04-24 RX ORDER — FENTANYL CITRATE 50 UG/ML
INJECTION, SOLUTION INTRAMUSCULAR; INTRAVENOUS
Status: DISCONTINUED | OUTPATIENT
Start: 2024-04-24 | End: 2024-04-24

## 2024-04-24 RX ORDER — PROPOFOL 10 MG/ML
VIAL (ML) INTRAVENOUS
Status: DISCONTINUED | OUTPATIENT
Start: 2024-04-24 | End: 2024-04-24

## 2024-04-24 RX ADMIN — PROPOFOL 50 MG: 10 INJECTION, EMULSION INTRAVENOUS at 12:04

## 2024-04-24 RX ADMIN — LIDOCAINE HYDROCHLORIDE 100 MG: 20 INJECTION, SOLUTION INTRAVENOUS at 12:04

## 2024-04-24 RX ADMIN — SODIUM CHLORIDE: 9 INJECTION, SOLUTION INTRAVENOUS at 12:04

## 2024-04-24 RX ADMIN — FENTANYL CITRATE 100 MCG: 50 INJECTION INTRAMUSCULAR; INTRAVENOUS at 12:04

## 2024-04-24 NOTE — ANESTHESIA PREPROCEDURE EVALUATION
04/24/2024  Roberto Rubio is a 48 y.o., male.    Social History     Socioeconomic History    Marital status: Legally    Tobacco Use    Smoking status: Never    Smokeless tobacco: Current     Types: Chew   Substance and Sexual Activity    Alcohol use: Never    Drug use: Never      Pre-op Assessment    I have reviewed the Patient Summary Reports.     I have reviewed the Nursing Notes. I have reviewed the NPO Status.   I have reviewed the Medications.     Review of Systems  Anesthesia Hx:  No problems with previous Anesthesia                Cardiovascular:     Hypertension                                        Musculoskeletal:  Arthritis               Psych:  Psychiatric History                Past Medical History:   Diagnosis Date    Arthritis     Depression     Hypertension     Malignant melanoma of skin, unspecified     Right interscapular back- 10/2005 0.25mm      Past Surgical History:   Procedure Laterality Date    CARPAL TUNNEL RELEASE Bilateral     INSERTION OF IMPLANTABLE LOOP RECORDER      NASAL SEPTOPLASTY N/A 01/04/2022    Procedure: SEPTOPLASTY, NOSE;  Surgeon: Amado Maya MD;  Location: [facility] OR;  Service: ENT;  Laterality: N/A;    RADIOFREQUENCY ABLATION OF LUMBAR MEDIAL BRANCH NERVE AT SINGLE LEVEL Bilateral 08/25/2021    Procedure: RADIOFREQUENCY ABLATION, NERVE, SPINAL, LUMBAR, MEDIAL BRANCH, 1 LEVEL;  Surgeon: Ricardo Boss MD;  Location: [facility] PAIN [facility];  Service: Pain Management;  Laterality: Bilateral;  Bilateral  L3-5 RFTC        Physical Exam  General: Well nourished, Cooperative, Alert and Oriented    Airway:  Mallampati: II     Chest/Lungs:  Clear to auscultation    Heart:  Rate: Normal        Anesthesia Plan  Type of Anesthesia, risks & benefits discussed:    Anesthesia Type: Gen Natural Airway, MAC  Intra-op Monitoring Plan: Standard ASA Monitors  Post Op  Pain Control Plan: multimodal analgesia and IV/PO Opioids PRN  Induction:  IV  Informed Consent: Informed consent signed with the Patient and all parties understand the risks and agree with anesthesia plan.  All questions answered. Patient consented to blood products? Yes  ASA Score: 3  Day of Surgery Review of History & Physical: I have interviewed and examined the patient. I have reviewed the patient's H&P dated: There are no significant changes.     Ready For Surgery From Anesthesia Perspective.     .

## 2024-04-24 NOTE — OP NOTE
04/24/2024  Roberto Rubio 1975    PREOPERATIVE DIAGNOSIS:     Lumbar Spondylosis without Myelopathy                                                                  POSTOPERATIVE DIAGNOSIS:   Lumbar Spondylosis without Myelopathy                                                                  PROCEDURE:  L4-5 and L5-S1 Bilateral  Lumbar Facet Injections under Fluoroscopic Guidance        SURGEON: Dr. Ricardo Boss  COMPLICATIONS:  None                            DRAINS AND PACKS:  None  ANESTHESIA:  MAC                                    BLOOD LOSS:  None     The patient was identified in the holding area.  The risk and benefits were again explained to the patient.  The patient agreed and consent obtained.   The site was marked with a skin pen.  The patient was taken to the procedure room and placed in the prone position on the C-Arm table.  All pressure points were checked and padded comfortably while the patient was awake.  The patients back was prepped and draped in the usual sterile fashion.  Anesthesia was initiated.   The patients facet joints at  L4-5 and L5-S1 were identified under direct fluoroscopic guidance.  A skin wheal was raised over each of the targeted areas with 0.25% Bupivacaine (2.5mg/ml) 1cc.  A 22 gauge 3 ½ inch needle was advanced into the interarticular surface of each of those levels on the left side.  Then 1.5 milliliters of a solution that contained  Aifelpi11su/ml 1 ml diluted into 9 milliliters of 0.25% Bupivacaine (2.5mg/ml) was injected at each level .  The needles were removed with its tip intact.  The procedure was repeated on the right side as described above. There was adequate hemostasis at the conclusion of the procedure.  The patient tolerated the procedure well with no adverse events.  There was no paresthesia with needle placement or injection.  The patient was taken in stable condition to the holding area and was monitored for the appropriate time of convalescence  and discharged to the care of the patients .      Preoperative pain was    9/10  Postoperative pain was   /10.Pipa   %

## 2024-04-24 NOTE — DISCHARGE SUMMARY
Patient underwent  L4-5 and L5-S1 Bilateral  Lumbar Facet Injections under Fluoroscopic Guidance procedure 04/24/2024. The pt will follow up in clinic. Discharged home. Discharge Dx: Lumbar Spondylosis without Myelopathy

## 2024-04-24 NOTE — TRANSFER OF CARE
"Anesthesia Transfer of Care Note    Patient: Roberto Rubio    Procedure(s) Performed: Procedure(s) (LRB):  BLOCK, NERVE, FACET JOINT, LUMBAR, MEDIAL BRANCH (Bilateral)    Patient location: PACU    Anesthesia Type: general    Transport from OR: Transported from OR on room air with adequate spontaneous ventilation    Post pain: adequate analgesia    Post assessment: no apparent anesthetic complications    Post vital signs: stable    Level of consciousness: responds to stimulation    Nausea/Vomiting: no nausea/vomiting    Complications: none    Transfer of care protocol was followed      Last vitals: Visit Vitals  /65 (BP Location: Left arm, Patient Position: Lying)   Pulse 62   Temp 36.1 °C (97 °F) (Oral)   Resp 12   Ht 5' 10" (1.778 m)   Wt 100.7 kg (222 lb)   SpO2 97%   BMI 31.85 kg/m²     "

## 2024-04-24 NOTE — ANESTHESIA POSTPROCEDURE EVALUATION
Anesthesia Post Evaluation    Patient: Roberto Rubio    Procedure(s) Performed: Procedure(s) (LRB):  BLOCK, NERVE, FACET JOINT, LUMBAR, MEDIAL BRANCH (Bilateral)    Final Anesthesia Type: general      Patient location during evaluation: PACU  Patient participation: Yes- Able to Participate  Level of consciousness: responds to stimulation  Post-procedure vital signs: reviewed and stable  Pain management: adequate  Airway patency: patent  HARDY mitigation strategies: Multimodal analgesia  PONV status at discharge: No PONV  Anesthetic complications: no      Cardiovascular status: hemodynamically stable  Respiratory status: unassisted, spontaneous ventilation and room air  Hydration status: euvolemic  Follow-up not needed.  Comments: The pt was not arousable even with painful stimulation during the procedure.   Refer to nursing note for pain/mejia score upon discharge from recovery.               Vitals Value Taken Time   /65 04/24/24 1311   Temp 36.1 °C (97 °F) 04/24/24 1311   Pulse 62 04/24/24 1311   Resp 12 04/24/24 1311   SpO2 97 % 04/24/24 1311         No case tracking events are documented in the log.      Pain/Mejia Score: Mejia Score: 8 (4/24/2024 12:55 PM)

## 2024-04-24 NOTE — PLAN OF CARE
REFER TO WRITTEN DOCUMENT AND RECOVERY INFORMATION.    D/CD PATIENT VIAA WHEELCHAIR AT 1339.    INFORMED PATIENT IF UNABLE TO VOID IN 8 HOURS, GO TO ER. NOTIFY MD OF REDNESS OR DRAINAGE FROM INJECTION SITE OR FEVER OVER 3-4 DAY. REST AND DRINK PLENTY OF FLUIDS FOR THE REMAINDER OF THE DAY. NO LIFTING OVER 5 LBS FOR THE REMAINDER OF THE DAY. CONTINUE REGULAR MEDICATIONS AS PRESCRIBED. MAY TAKE PAIN MEDICATION AS PRESCRIBED.     PAIN IMPROVED  100%    Preop pain 9.    Postop pain 0.

## 2024-04-24 NOTE — H&P
"[facility] - Pain Management  Pain Management  H&P    Patient Name: Roberto Rubio  MRN: [id number]  Admission Date: 2024  Primary Care Provider: Slava Hall NP    Patient information was obtained from     Subjective:     Principal Problem:  So ZACHDominick Monaco P  [address]  [place] [address]  [phone]                   RE: Roberto Rubio      : 1975   Date of Service: 3/7/2024   Existing Patient           Chief Complaint:   Back pain achy in nature, constant, sharp; located in the lumbar region, bilaterally; aggravated by activity, sitting, bending, lifting; relieved by analgesics, rest; gradual in occurrence; pain rated as 6/10 on the pain scale,  Hip pain right; characterized as sharp pain , shooting pain , aching, instability aggravated by walking standing ; relieved by narcotics, muscle relaxants; gradual in onset.   Patient seated in room 4 with complaints of low back and hips pain, states pain is the same since last visit         History of Present Illness:   What part of the body? low back and hips   Pain level at best 2; Pain level at worst 8; Pain level at present 6; Pain level on average 3   49 y/o WM with complaints of "low back pain"; objective data essentially unchanged from previous visit; he has been stretching out his meds due to missing his last appt and RFTC; he is ready for an injection but still has some testing done for cardiac issues; he had a syncopal episode in Sept that caused him to pass out and has been in the hospital for a few days; he is going to be scheduled to have a stress test but still has a loop recorder in place right now; this happened in  and has a cardiologist there as well; he is here for 3 month follow up for med refill; he has had his Bilateral L4-L5 RFTC in 2023 that improved pain by 95% and is still doing ok with pain relief and overall, pain is better with meds and rest and will need to see us again in  in order to " have RFTC done in Feb but only if he has been cleared by cardiology; pain is worse with standing for long periods or with driving long distances and has been unable to take pain meds while at work; he is still taking his [employer] while at work; states that he has had about 95% after Bilateral L2-L5 RFTC in April 2022; his last Hip IA injection was Jan 2021 but states that this has improved since going back to work; states that pain improved by 100% after Bilateral L2-L5 RFTC in Aug 2021; pain is worse with movement at work while walking up and down stairs that is worse to R hip; states that pain has been unchanged from previous visit; he has not been able to take any pain meds while working but he has taken the stairs while at work that has caused more pain but this improves while being at home and not as busy; states that pain improved by 95% after Right Hip IA injection and continues to have pain relief but can tell when he walks more, pain is worse but he is exercising some as well; states that his pain improved by 95% after Right L2-L5 RFTC and also had about 100% after his Left L2-L5 RFTC to help control pain better; states that pain has been better from previous visit and meds are helping with pain; states that pain has improved by 95% after his Bilateral L2-L5 RFTC in Feb 2020; his RFTC in May 2019 lasted for several months with pain relief; states that his last Norco caused diarrhea and upset stomach but now the Percocet is helping much better pain without side effects of medicine; states that pain improved by 100% after his Right SI joint injection and is still doing good with pain control; states that pain has improved by about 100% after his Bilateral L2-L5 RFTC in May 2019; he continues to have pain that radiates down into his leg but this is controlled for now with meds; states that his lower back pain had been better since having his Bilateral L2-L5 RFTC in May 2019 but now pain has returned; he does  have pain radiating down into his legs that stops at his knee and also feels like he is having more R hip pain that describes as an electrical shock; he has had both Bilateral L3-S1 FI injections Oct and Dec 2017 with 100% improvement of pain that lasted for several weeks until he had to go back to work; states that essentially pain has been unchanged since last visit and is only using the Norco as needed and meds are working well; states that pain is worse with sitting for long periods and will cause occasional numbness and tingling to R leg and also has low back pain with movement that radiates into his hips; he has had an L3-S1 Caudal BRITTANI in Oct 2016 with good improvement of pain; also feels like he is having more muscle spasms to his lower back and states that Zanaflex is helping and is requesting a med refill     UDS: consistent x 12; inconsistent x 7(out of meds due to missing appts) UDS due today   The previous urine drug screen was evaluated, and it was compliant for the medications that has been prescribed. A presumptive urine drug screen was done today to rapidly obtain and integrate results into clinical assessment and decision-making for ongoing safe prescribing of controlled substances. The results of the presumptive UDS done last visit was positive for opiates. he is prescribed hydrocodone. Because presumptive UDS positive results are not definitive due to sensitivity and specificity and cross reactivity limitations and negative results do not necessarily indicate absence of drugs or substances in the urine specimen, confirmation will identify specific prescribed and non-prescribed medications or illicit use for ongoing safe prescribing of controlled substances including benzodiazepines, opioid agonist, opioids antagonist, partial agonist, stimulants, muscle relaxers, antidepressants, sleep aids, anti-seizure medicine, and alcohol. Urine drug analysis is used to assist with diagnosis and therapeutic  decision-making concerning pretreatment assessment. Intensity and frequency of monitoring with urine drug testing will be based on the risk stratification method in determining risk level for opioid addiction.     Meds: Percocet 7.5 mg - due today  and  Butrans patch 15 mcg -- due today; requests a change in meds and states that meds help with pain; no misuse of meds and no opioid abuse;  reviewed and is appropriate; discussed the CDC guidelines and voiced understanding; he is currently only taking 22.5 mg of morphine equivalent meds/day      Nursing:   Pain Medication/Dose/Last Taken/# Taken  Norco 7.5-325  -with meds 0/10  -without meds 8/10        Allergies:   Allergies Reviewed - 24 10:54:57 AM CST  NSAIDs       Current Medications:   Medications List Reviewed (24 10:54:49 AM CST)  hydroCHLOROthiazide Oral Capsule 12.5 MG (5/15/2023) Take 1 capsule once a day  Percocet Oral Tablet 7.5-325 MG (3/7/2024) Take 1 tablet three times a day as needed for 30 day(s)  tiZANidine HCl Oral Tablet 4 MG (3/7/2024) Take 1 tablet three times a day for 30 day(s)  Xanax Oral Tablet 0.5 MG (2019) Take 1 tablet once a day as needed  Buprenorphine Transdermal Patch Weekly 15 MCG/HR (3/7/2024) Take 1 patch once a week for 4 week(s)      Previous Studies:  MRI  Final Report  MR MRI LUMBAR SPINE W/O CONTRAST    Show Printer-Friendly Version Patient Name: Roberto Rubio   : May-   ID: [id number]([facility])   Study Date: Aug- 11:08             Studies- MRI LUMBAR SPINE W/O CONTRAST Indications- Back pain. The radiation both legs worse on left. Comparison- MRI lumbar spine 2014. Technique- Using a 1.5 Richa magnet, multisequence multiplanar MR imaging of the lumbar spine was performed without administration of intravenous contrast. Findings- Alignment of lumbar vertebral bodies appears within normal limits. Diffusely low T1 signal is noted throughout the bone marrow of the vertebral bodies. The  signal appears isointense to the intervertebral discs slightly hyperintense compared to the intervertebral discs and is stable compared to prior study. This could reflect red marrow conversion as can be seen with smokers as well as chronic anemic states. Conus appears within normal limits. Intrathecal nerve roots appear within normal limits. Intervertebral disc spaces are fairly well maintained throughout the lumbar spine. No significant desiccation is suggested. At L1-L2, there is no evidence of significant central or neuroforaminal stenosis. At L2-L3, there is no evidence of significant central or neuroforaminal stenosis. At L3-L4, there is no evidence of significant central or neuroforaminal stenosis. Minimal bilateral facet arthropathy is present. At L4-L5, there is no evidence of significant central or neuroforaminal stenosis. Minimal bilateral facet arthropathy is demonstrated. At L5-S1, small broad-based posterior disc bulge is present. Additional minimal bilateral facet arthropathy is demonstrated. No significant central and mild bilateral neuroforaminal stenosis is suggested. Focal fluid signal underlying annular fibers suggests tearing of annular fibers. Intra-abdominal contents demonstrate no evidence of significant pathology. Paraspinous musculature is bilaterally symmetric. Conclusion- 1. Mild bilateral neuroforaminal stenosis is present at L5-S1. At L5-S1 there is additional focal fluid signal underlying posterior annular fibers suggesting a tear of the annular fibers. 2. Other findings as detailed. 8/18/2017 10-48 AM Ordering physician-BRIGIDA LAN [facility] Point of service- [facility]. This report has been electronically signed by Jordyn Lopez - SHIRA Dillard Radiologist- JORDYN LOPEZ II, M.D. Releasing Radiologist- JORDYN LOPEZ II, M.D. Released Date Time- 08/18/17 1048 ------------------------------------------------------------------------------  ;  X-RAY  Final Report  CR CR HIPS BILATERAL    Show Printer-Friendly Version with Image (1 of 1)  Show Printer-Friendly Version without images Patient Name: Roberto Rubio   : May-   ID: [id number]([facility])   Study Date:  13:05             2 VIEWS OF THE BILATERAL HIPS WERE OBTAINED. INDICATION- Pain COMPARISON- None FINDINGS- No acute fracture or subluxation. No lytic or blastic lesion. There mild bilateral, left slightly greater than right, hip osteoarthritis. This is a examples provided by the acetabular roof osteophytosis and mild subchondral sclerosis. Subtle increased osseous prominence of the lateral left femoral head neck junction suggesting changes of a mild/early CAM type impingement. The soft tissues are within normal limits. IMPRESSION- As above. This report has been electronically signed by Victor M Morelos MD - [employer] [place] Physician- VICTOR M MORELOS M.D. Releasing Physician- VICTOR M MORELOS M.D. Released Date Time- 19 1351 ------------------------------------------------------------------------------   Past Medical History:   The patient has a past medical history of  Hypertension.  There is no past medical history of  Chronic Obstructive Pulmonary Disease, Depression, Cardiovascular Disease, Anxiety, Terminal Illness.      Surgical History:   Bilateral Carpel Tunnel      Social History:      Smoking Status: Never smoker; Last Reviewed: 2024                                    Review of Systems:   General:  Patient denies  sweats, fatigue, fever, chills.  Eyes:  Patient denies  blurred vision.  Ears, Nose and Throat:  Patient denies  ringing in the ears, difficulty swallowing.  Cardiovascular:  Patient denies  chest pain, palpitations.  Respiratory:  Patient denies  shortness of breath.  Gastrointestinal:   Patient admits to   nausea.  Patient denies  vomiting, diarrhea, constipation.  Genitourinary:  Patient denies  urinary frequency, burning on  urination.  Endocrine:  Patient denies  thyroid problems.  Hematologic:  Patient denies  bleeding tendencies, easy bruising tendency.  Musculoskeletal:  Patient denies  joint pain, walking aids.  Neurologic  Patient denies  seizures, headache.  Psychologic:  Patient denies  anxiety, panic attacks, depression.  Skin:  Patient denies  pruritus, skin rash.       DEPRESSION SCREENING:   Not at all the patient reports little interest or pleasure in doing things.  Not at all the patient reports feeling down, depressed, or hopeless.  Date Depression Screening Last Done: 03/05/2020   PHQ-2 Score 0; PHQ-9 Score incomplete      Vital Signs:   Weight 226 lbs; Height 5 ft 10 in; BMI 32.4   03/07/2024 10:43 AM (CST)  Temperature 97.8 °F; Respiration Rate 18   03/07/2024 10:46 AM (CST)  Respiration Rate 18; Pulse Rate 81 bpm; Blood Pressure 150 / 103 mm/Hg; Pain Level: 6         Physical Examination:   Pre Anesthesia evaluation  Pre Op dx: lumbar spondylosis  Planned procedure: Bilateral l2-L4 RFTC  Age: 44  Ht: 5 ft 3 in  Wt: 199 lbs  BMI: 27.8  Allergies: NSAIDS  Meds/Labs/Test  Prior Surgeries: bilateral carpal tunnel surgery,  Anethesia complications: none     Medical History  CNS: __Neg.   __Seizures  __CVA  __TIA  __HA  _X_Depression  Cardiac: _X_Neg  __CAD  __Stents  __MI  __HTN  __CHF  Pulmonary: _X_Neg  __COPD  __Asthma  __Sleep Apnea  __Smoker PPD  __CPAP  GI:_X_Neg.  __Reflux  __Liver Dysfunction  __Hepatitis  __Hiatal Hernia  __Hepatitis  __ETOH  __GERD   Renal:  _X_Neg.  __CRI  __ESRD  Endocrine:  _X_Neg.  __Thyroid  __Diabetes  Heme:  _X_Neg.   __Blood thinners  __other      Back Motion:   Lumbar / lumbosacral spine abnormal.      Tenderness on Palpation:   Lumbosacral Spine:  There is tenderness on palpation of the  right sciatic notch moderate in nature moderate to severe.         Additional Physical Findings:  General general appearance normal  Head normal head exam  Neck normal neck exam  Eyes normal eye  exam  Chest normal chest exam  Respiratory normal respiratory exam  Musculoskeletal abnormal low back pain,   Tenderness on palpation, muscle tenderness  Neurologic normal neurologic exam  Skin normal skin exam       Toxicology Report   Toxicology was performed.   Reason for Toxicology:  A presumptive urine drug screen was done today to rapidly obtain and integrate results into clinical assessment and decision-making for ongoing safe prescribing of controlled substances.   Test Date/Time: 03/07/2024 00:00   Tested By: EM   Oxycodone  (OXY): Result = Negative; Control = Positive   Morphine  (OPI): Result = Negative; Control = Positive   Amphetamines  (AMP): Result = Negative; Control = Positive   Oxazepam  (BZO): Result = Negative; Control = Positive   Methadone  (MTD): Result = Negative; Control = Positive   Secobarbital  (BAR): Result = Negative; Control = Positive   Tricyclic Antidepressants  (TCA): Result = Negative; Control = Positive   Nortriptyline  (TCA): Result = Negative; Control = Positive   Marijuana-Carboxy Tetrahydrocannabinoid   (THC): Result = Negative; Control = Positive   Cocaine  (JOSH): Result = Negative; Control = Positive   Ecstasy-Methylenedioxymethamphetamine  (MDMA): Result = Negative; Control = Positive   D Methamphetamine  (MET): Result = Negative; Control = Positive   Phencyclidine  (PCP): Result = Negative; Control = Positive   Adulterants  (OX, SG, pH): Result = Negative; Control = Positive      Assessment:   (M54.5) - Low back pain  (M53.3) - Disorder of sacrum  (M79.605) - Pain of left leg  (M54.16) - Lumbar radiculopathy  (M47.27) - Other spondylosis with radiculopathy, lumbosacral region  (M47.817) - Lumbosacral spondylosis  (M53.3) - Sacrococcygeal disorders, not elsewhere classified  (M79.604) - Pain of right leg      Plan:   Follow up visit 2 months      -change to  Percocet 7.5 mg - due today  -gave rx for April and May with hold date 04/06/2024 and 05/06/2024   -refilled Butrans  -- due today with 2 RF   -refilled rx Zanaflex to [facility] with 5 RF  (due again in Sept 2024)  -drink plenty of fluids and water  -increase fiber in diet  -need cardiac clearance Dr. Patterson for cardiac [facility] at [facility] heart and vascular [phone])  -scheduled  Bilateral L4-S1 FI#1 at [facility] on 04/17/2024 at 7:30 am         Monitored Anesthesia Care medical necessity authorization request:   Monitor anesthesia request is medically indicated for the scheduled _Bilateral L4-S1 FI#1 _______procedure due to:     - needle phobia and anxiety, placing the patient at risk during the provided service._YES___  - patient has a BMI greater than 45 ____  - patient has severe sleep apnea for which BiPAP and oxygen are needed while sleeping._____  - patient is unable to follow simple commands due to mental state.____  - patient has an ASA class greater than 3 and requires constant presence of an anesthesiologist/CRNA during the procedure.____  - patient has severe problems with muscles and muscle spasticity that makes it hard to lie still. ____  - patient suffers from chronic pain and is unable to function due to diminished ADL's._YES___  - patient is dependent on opioids or sedatives. _YES___   - Other __YES__      Patient will be scheduled for facet joint injections of the lumbar spine. We have discussed the need for two diagnostically sound procedures before the radiofrequency ablation of the medial branch nerves can be scheduled. Pt has had 2 (+) diagnostic blocks schedule for Bilateral L2-5 RFTC.     Patient will be scheduled for facet joint injections of the lumbar spine. We have discussed the need for two diagnostically sound procedures before the radiofrequency ablation of the medial branch nerves can be scheduled. Diagnostic blocks of the Lumbar facet nerves, levels @ L-4,L-5,and S1, by blocking these medial branch nerves it will effectively block the lumbar facet joints, @ Levels L4-5 and L5-S1   Indications  for this procedure for this specific patient include the following:   - Pt has had symptoms for three months with moderate to severe pain with functional impairment rated of  /10 pain.   - Pain non-responsive to conservative care.  - Pain predominately axial and not associated with radiculopathy or claudication.  - No non-facet pathology as source of pain.  - Clinical assessment implicaties facet joint as putative pain source.   - Pain is exacerbated by extension or prolonged sitting/standing and relieved by rest.   - No unexplained neurologic deficit.   - No history of coagulopathy , infection or unstable medical conditions.  - Pain is causing significant functional limitation resulting in diminished quaility of life and impaired age appropriate ADL's.  - Repeat injections not done prior to 7 days.  - No more than 2 levels will be done per side.      NSAIDS Failure_YES____  Pain for 3 months or >__YeS___  Pain level 6> intermittent or continuous_YES___  Physical exam with documented signs that facets are the primary source of pain__YES__        NARCOTIC STATEMENT  Patient is taking the narcotic pain medications as prescribed. Refill is being given because of the benefit to the patient in regards to the pain. Patient has agreed not to abuse of medication and not to take it more than what is prescribed. The nature of the drug including the potential for addiction and dependency and abuse was also discussed with the patient. Patient has developed physical dependency for the narcotic pain medication for his pain relief.  Patient has also developed tolerance to the sedative effect of the narcotic pain medications.  Patient has decided to continue with these medications despite potential for addiction as described by this office.  This was stressed to the patient that it is the patient's responsibility to secure the narcotic medication and in any event of loss for any reason whatsoever,  there will be no refill before  the next due date. Patient also understands that they are not supposed to drive or work on machinery while taking these medications.  Also explained to the patient that in the event of traffic citation, the presence of this drug in  bloodstream may result in DUI.  Patient has been advised not to drink alcohol while taking this medication.  Patient has verbalized understanding of our office policy and has signed a contract with us in this regard.      Prescriptions Written Today:  Percocet Oral Tablet 7.5-325 MG  Take 1 tablet three times a day as needed for 30 day(s)  Refills: No Refills  Rx quantity: 90  Take 1 tablet three times a day as needed for 30 day(s)  Refills: No Refills  Rx quantity: 90  Take 1 tablet three times a day as needed for 30 day(s)  Refills: No Refills  Rx quantity: 90,  tiZANidine HCl Oral Tablet 4 MG  Take 1 tablet three times a day for 30 day(s)  Refills: 5  Rx quantity: 90,  Buprenorphine Transdermal Patch Weekly 15 MCG/HR  Take 1 patch once a week for 4 week(s)  Refills: 2  Rx quantity: 4                 So Monaco       Electronically signed: 3/7/2024 2:46:13 PM      Ricardo Boss MD      Electronically signed: 3/17/2024 5:11:19 PM       Chief Complaint:      HPI:       Assessment/Plan:         Ricardo Boss MD  Pain Management  [facility]

## 2024-07-08 ENCOUNTER — OFFICE VISIT (OUTPATIENT)
Dept: DERMATOLOGY | Facility: CLINIC | Age: 49
End: 2024-07-08
Payer: COMMERCIAL

## 2024-07-08 DIAGNOSIS — L82.1 SEBORRHEIC KERATOSES: ICD-10-CM

## 2024-07-08 DIAGNOSIS — Z85.820 HISTORY OF MELANOMA: ICD-10-CM

## 2024-07-08 DIAGNOSIS — D22.9 BENIGN NEVUS OF SKIN: ICD-10-CM

## 2024-07-08 DIAGNOSIS — L08.9 SKIN INFECTION: ICD-10-CM

## 2024-07-08 DIAGNOSIS — L57.8 OTHER SKIN CHANGES DUE TO CHRONIC EXPOSURE TO NONIONIZING RADIATION: Primary | ICD-10-CM

## 2024-07-08 PROCEDURE — 1159F MED LIST DOCD IN RCRD: CPT | Mod: ,,, | Performed by: DERMATOLOGY

## 2024-07-08 PROCEDURE — 99213 OFFICE O/P EST LOW 20 MIN: CPT | Mod: ,,, | Performed by: DERMATOLOGY

## 2024-07-08 PROCEDURE — 1160F RVW MEDS BY RX/DR IN RCRD: CPT | Mod: ,,, | Performed by: DERMATOLOGY

## 2024-07-08 PROCEDURE — 87070 CULTURE OTHR SPECIMN AEROBIC: CPT | Mod: ,,, | Performed by: CLINICAL MEDICAL LABORATORY

## 2024-07-08 RX ORDER — MUPIROCIN 20 MG/G
OINTMENT TOPICAL 3 TIMES DAILY
Qty: 30 G | Refills: 6 | Status: SHIPPED | OUTPATIENT
Start: 2024-07-08

## 2024-07-08 NOTE — PROGRESS NOTES
[facility]   Sadie Rodríguez MD    Patient Name: Roberto Rubio  Patient YOB: 1975   Date of Service: 7/8/24    CC: Full Skin Exam    HPI: Roberto Rubio is a 49 y.o. male presents today for a full skin exam.  Patient was last seen 5/2023 and dermatologic history includes melanoma. Patient has no concerns at this time.     Past Medical History:   Diagnosis Date    Arthritis     Depression     Hypertension     Malignant melanoma of skin, unspecified     Right interscapular back- 10/2005 0.25mm     Past Surgical History:   Procedure Laterality Date    CARPAL TUNNEL RELEASE Bilateral     INJECTION OF ANESTHETIC AGENT AROUND MEDIAL BRANCH NERVES INNERVATING LUMBAR FACET JOINT Bilateral 4/24/2024    Procedure: BLOCK, NERVE, FACET JOINT, LUMBAR, MEDIAL BRANCH;  Surgeon: Ricardo Boss MD;  Location: [facility];  Service: Pain Management;  Laterality: Bilateral;  Bilateral L4-S1 FI    INSERTION OF IMPLANTABLE LOOP RECORDER      NASAL SEPTOPLASTY N/A 01/04/2022    Procedure: SEPTOPLASTY, NOSE;  Surgeon: Amado Maya MD;  Location: [facility] OR;  Service: ENT;  Laterality: N/A;    RADIOFREQUENCY ABLATION OF LUMBAR MEDIAL BRANCH NERVE AT SINGLE LEVEL Bilateral 08/25/2021    Procedure: RADIOFREQUENCY ABLATION, NERVE, SPINAL, LUMBAR, MEDIAL BRANCH, 1 LEVEL;  Surgeon: Ricardo Boss MD;  Location: [facility];  Service: Pain Management;  Laterality: Bilateral;  Bilateral  L3-5 RFTC     Review of patient's allergies indicates:   Allergen Reactions    Nsaids (non-steroidal anti-inflammatory drug)        Current Outpatient Medications:     buprenorphine 15 mcg/hour PTWK, Apply 1 patch topically every 7 days., Disp: , Rfl:     busPIRone (BUSPAR) 7.5 MG tablet, 7.5 mg 2 (two) times a day., Disp: , Rfl:     cholecalciferol, vitamin D3, 1,250 mcg (50,000 unit) capsule, Take by mouth once a week., Disp: , Rfl:     mupirocin (BACTROBAN) 2 % ointment, Apply topically 3 (three) times  daily., Disp: 30 g, Rfl: 6    oxyCODONE-acetaminophen (PERCOCET) 7.5-325 mg per tablet, Take 1 tablet by mouth 3 (three) times daily as needed. , Disp: , Rfl:     rosuvastatin (CRESTOR) 10 MG tablet, 10 mg every evening., Disp: , Rfl:     sertraline (ZOLOFT) 50 MG tablet, 50 mg once daily., Disp: , Rfl:     testosterone cypionate (DEPOTESTOTERONE CYPIONATE) 100 mg/mL injection, Inject 1,000 mg into the muscle once a week., Disp: , Rfl:     tiZANidine (ZANAFLEX) 4 MG tablet, Take 4 mg by mouth every 8 (eight) hours as needed., Disp: , Rfl:     ROS: A focused review of systems was obtained and negative.     Exam: A full skin exam was performed including scalp, hair, face, neck, chest, back, abdomen, right arm, left arm, right hand, left hand, nails, right leg, and left leg.  All areas examined were normal expect as per below in assessment and plan.  General Appearance of the patient is well developed and well nourished.  Orientation: alert and oriented x 3.  Mood and affect: pleasant.    Assessment:   The primary encounter diagnosis was Other skin changes due to chronic exposure to nonionizing radiation. Diagnoses of Seborrheic keratoses, Benign nevus of skin, Skin infection, and History of melanoma were also pertinent to this visit.    Plan:   Medications Ordered This Encounter   Medications    mupirocin (BACTROBAN) 2 % ointment     Sig: Apply topically 3 (three) times daily.     Dispense:  30 g     Refill:  6       Seborrheic Keratosis (L82.1)  - Stuck-on, warty, greasy brown papule with pseudo-horn cysts scattered on the trunk and extremities    Plan: Counseling.  I counseled the patient regarding the following:  Skin Care: Seborrheic Keratoses are benign. No treatment is necessary.  Expectations: Seborrheic Keratoses are benign warty growths. Patients get more of them as they age    Plan: Reassurance      Benign Nevus (D22.72)  - Dome shaped regular papule    Plan: Reassurance.    Plan: Counseling.  I counseled  the patient regarding the following:  Instructions: Monthly self-skin checks to monitor for any changes in moles are recommended.  Expectations: Benign Nevi are pigmented nests of cells within the skin. No treatment is necessary.  Contact Office if: Any moles change in size, shape or color; itch, burn or bleed.      Skin Infection, NOS   - crusted papule on the left chest     Plan: Counseling  I counseled the patient regarding the following:  Skin care: Patients with purulence or fluid collections should have their wounds re-opened, drained, cultured and irrigated. All wound infections should be treated with antibiotics.  Expectations: Wound Infections usually occur 4-7 days postoperatively. Patients exhibit, pain, rednes, swelling, cellulitic changes and fever.  Contact Office if: Wound Infection fails to respond to treatment or worsens, patient develops a fever, or if redness spreads despite antibiotics.    A bacterial culture was obtained from the chest    - Will treat empirically with mupirocin ointment while waiting for culture results  - will monitor for NMSC    History of malignant melanoma  - well healed scar with NER  Associated diagnosis: Medical surveillance following completed treatment    Plan: Counseling  I counseled the patient regarding the following:  Skin Care: Patients with a history of melanoma should wear broad spectrum sunscreen and sun protective clothing.  Expectations: Scars from excisional sites of melanoma should be monitored for any recurrences. Monthly self-skin checks should be performed to monitor for any moles that change in size, shape or color, itch burn or bleed.  Contact Office if: Patient notices any new or changing moles, develops constitutional symptoms or develops new lesions within or around the previous melanoma scar.      Other Skin Changes Due to Chronic Exposure of Nonionizing Radiation (L57.8)    Plan: Monitoring.     Plan: Sunscreen Recommendations.  I recommended a  broad spectrum sunscreen with a SPF of 30 or higher. I explained that SPF 30 sunscreens block approximately 97 percent of the  sun's harmful rays. Sunscreens should be applied at least 15 minutes prior to expected sun exposure and then every 2 hours after that as long as  sun exposure continues. If swimming or exercising sunscreen should be reapplied every 45 minutes to an hour after getting wet or sweating. One  ounce, or the equivalent of a shot glass full of sunscreen, is adequate to protect the skin not covered by a bathing suit. I also recommended a lip  balm with a sunscreen as well. Sun protective clothing can be used in lieu of sunscreen but must be worn the entire time you are exposed to the  sun's rays.        Follow up in about 1 year (around 7/8/2025) for FSE.    Sadie Rodríguez MD

## 2024-07-10 LAB — MICROORGANISM SPEC CULT: NORMAL

## 2025-01-22 ENCOUNTER — HOSPITAL ENCOUNTER (OUTPATIENT)
Facility: HOSPITAL | Age: 50
Discharge: HOME | End: 2025-01-22
Attending: ANESTHESIOLOGY | Admitting: ANESTHESIOLOGY
Payer: COMMERCIAL

## 2025-01-22 ENCOUNTER — ANESTHESIA (OUTPATIENT)
Dept: PAIN MEDICINE | Facility: HOSPITAL | Age: 50
End: 2025-01-22
Payer: COMMERCIAL

## 2025-01-22 ENCOUNTER — ANESTHESIA EVENT (OUTPATIENT)
Dept: PAIN MEDICINE | Facility: HOSPITAL | Age: 50
End: 2025-01-22
Payer: COMMERCIAL

## 2025-01-22 VITALS
WEIGHT: 232 LBS | SYSTOLIC BLOOD PRESSURE: 128 MMHG | RESPIRATION RATE: 16 BRPM | OXYGEN SATURATION: 93 % | DIASTOLIC BLOOD PRESSURE: 84 MMHG | TEMPERATURE: 99 F | HEIGHT: 70 IN | BODY MASS INDEX: 33.21 KG/M2 | HEART RATE: 79 BPM

## 2025-01-22 DIAGNOSIS — M47.816 LUMBAR SPONDYLOSIS: ICD-10-CM

## 2025-01-22 PROCEDURE — D9220A PRA ANESTHESIA: Mod: ,,, | Performed by: NURSE ANESTHETIST, CERTIFIED REGISTERED

## 2025-01-22 PROCEDURE — 64493 INJ PARAVERT F JNT L/S 1 LEV: CPT | Mod: 50 | Performed by: ANESTHESIOLOGY

## 2025-01-22 PROCEDURE — 63600175 PHARM REV CODE 636 W HCPCS: Performed by: ANESTHESIOLOGY

## 2025-01-22 PROCEDURE — 63600175 PHARM REV CODE 636 W HCPCS: Performed by: NURSE ANESTHETIST, CERTIFIED REGISTERED

## 2025-01-22 PROCEDURE — 64494 INJ PARAVERT F JNT L/S 2 LEV: CPT | Mod: 50 | Performed by: ANESTHESIOLOGY

## 2025-01-22 PROCEDURE — 37000008 HC ANESTHESIA 1ST 15 MINUTES: Performed by: ANESTHESIOLOGY

## 2025-01-22 RX ORDER — PROPOFOL 10 MG/ML
VIAL (ML) INTRAVENOUS
Status: DISCONTINUED | OUTPATIENT
Start: 2025-01-22 | End: 2025-01-22

## 2025-01-22 RX ORDER — LIDOCAINE HYDROCHLORIDE 20 MG/ML
INJECTION INTRAVENOUS
Status: DISCONTINUED | OUTPATIENT
Start: 2025-01-22 | End: 2025-01-22

## 2025-01-22 RX ORDER — SODIUM CHLORIDE 9 MG/ML
500 INJECTION, SOLUTION INTRAVENOUS CONTINUOUS
Status: DISCONTINUED | OUTPATIENT
Start: 2025-01-22 | End: 2025-01-22 | Stop reason: HOSPADM

## 2025-01-22 RX ORDER — TRIAMCINOLONE ACETONIDE 40 MG/ML
INJECTION, SUSPENSION INTRA-ARTICULAR; INTRAMUSCULAR CODE/TRAUMA/SEDATION MEDICATION
Status: DISCONTINUED | OUTPATIENT
Start: 2025-01-22 | End: 2025-01-22 | Stop reason: HOSPADM

## 2025-01-22 RX ORDER — BUPIVACAINE HYDROCHLORIDE 2.5 MG/ML
INJECTION, SOLUTION INFILTRATION; PERINEURAL CODE/TRAUMA/SEDATION MEDICATION
Status: DISCONTINUED | OUTPATIENT
Start: 2025-01-22 | End: 2025-01-22 | Stop reason: HOSPADM

## 2025-01-22 RX ADMIN — PROPOFOL 50 MG: 10 INJECTION, EMULSION INTRAVENOUS at 09:01

## 2025-01-22 RX ADMIN — LIDOCAINE HYDROCHLORIDE 100 MG: 20 INJECTION, SOLUTION INTRAVENOUS at 09:01

## 2025-01-22 RX ADMIN — PROPOFOL 30 MG: 10 INJECTION, EMULSION INTRAVENOUS at 09:01

## 2025-01-22 NOTE — ANESTHESIA RELEASE NOTE
"Anesthesia Release from PACU Note    Patient: Roberto Rubio    Procedure(s) Performed: Procedure(s) (LRB):  BLOCK, NERVE, FACET JOINT, LUMBAR, MEDIAL BRANCH (Bilateral)    Anesthesia type: general    Post pain: Adequate analgesia    Post assessment: no apparent anesthetic complications    Last Vitals: Visit Vitals  /77 (BP Location: Right arm, Patient Position: Lying)   Pulse 87   Temp 37 °C (98.6 °F) (Oral)   Resp 18   Ht 5' 10" (1.778 m)   Wt 105.2 kg (232 lb)   SpO2 95%   BMI 33.29 kg/m²       Post vital signs: stable    Level of consciousness: awake, alert , and oriented    Nausea/Vomiting: no nausea/no vomiting    Complications: none    Airway Patency: patent    Respiratory: unassisted    Cardiovascular: stable and blood pressure at baseline    Hydration: euvolemic  "
Abdomen soft, non-tender, no guarding.

## 2025-01-22 NOTE — H&P
"[facility] - Pain Management  Pain Management  H&P    Patient Name: Roberto Rubio  MRN: [id number]  Admission Date: 2025  Primary Care Provider: Slava Hall NP    Patient information was obtained from     Subjective:     Principal Problem:So [place] P  [address]  [place] [address]  [phone]          RE: Roberto Rubio  : 1975  Date of Service: 2024      Chief Complaint:  Back pain achy in nature, constant, sharp; located in the lumbar region, bilaterally; aggravated by activity, sitting, bending, lifting; relieved by analgesics, rest; gradual in occurrence; pain rated as 6/10 on the pain scale,  Hip pain right; characterized as sharp pain , shooting pain , aching, instability aggravated by walking standing ; relieved by narcotics, muscle relaxants; gradual in onset.  Patient seated in room 1 with complaints of low back and hips pain, states pain is the same since last visit     History of Present Illness:  What part of the body? lpower back and hip  Pain level at best 2; Pain level at worst 8; Pain level at present 6; Pain level on average 3  48 y/o WM with complaints of "low back pain"; objective data essentially unchanged from previous visit; he has started having leg sweating that usually starts prior to pain; he is scheduled to go back on ; we will schedule his #2 injection in  when he gets home from work; states that pain improved by 95% after bilateral L4-S1 FI#1 that lasted for several weeks but needs to have his #2 scheduled and then proceed with RFTC; he was able to have clearance from cardiology prior to last injection after he was worked-up for a syncopal episode in Sept that caused him to pass out; he has had his Bilateral L4-L5 RFTC in 2023 that improved pain by 95%; overall, pain is better with meds and rest; pain is worse with standing for long periods or with driving long distances and has been unable to take pain meds while at work; " he is still taking his [employer] while at work; states that he has had about 95% after Bilateral L2-L5 RFTC in April 2022; his last Hip IA injection was Jan 2021 but states that this has improved since going back to work; states that pain improved by 100% after Bilateral L2-L5 RFTC in Aug 2021; pain is worse with movement at work while walking up and down stairs that is worse to R hip; states that pain has been unchanged from previous visit; he has not been able to take any pain meds while working but he has taken the stairs while at work that has caused more pain but this improves while being at home and not as busy; states that pain improved by 95% after Right Hip IA injection and continues to have pain relief but can tell when he walks more, pain is worse but he is exercising some as well; states that his pain improved by 95% after Right L2-L5 RFTC and also had about 100% after his Left L2-L5 RFTC to help control pain better; states that pain has been better from previous visit and meds are helping with pain; states that pain has improved by 95% after his Bilateral L2-L5 RFTC in Feb 2020; his RFTC in May 2019 lasted for several months with pain relief; states that his last Norco caused diarrhea and upset stomach but now the Percocet is helping much better pain without side effects of medicine; states that pain improved by 100% after his Right SI joint injection and is still doing good with pain control; states that pain has improved by about 100% after his Bilateral L2-L5 RFTC in May 2019; he continues to have pain that radiates down into his leg but this is controlled for now with meds; states that his lower back pain had been better since having his Bilateral L2-L5 RFTC in May 2019 but now pain has returned; he does have pain radiating down into his legs that stops at his knee and also feels like he is having more R hip pain that describes as an electrical shock; he has had both Bilateral L3-S1 FI injections Oct  and Dec 2017 with 100% improvement of pain that lasted for several weeks until he had to go back to work; states that essentially pain has been unchanged since last visit and is only using the Norco as needed and meds are working well; states that pain is worse with sitting for long periods and will cause occasional numbness and tingling to R leg and also has low back pain with movement that radiates into his hips; he has had an L3-S1 Caudal BRITTANI in Oct 2016 with good improvement of pain; also feels like he is having more muscle spasms to his lower back and states that Zanaflex is helping and is requesting a med refill         UDS: consistent x 12; inconsistent x 8 (out of meds due to missing appts) UDS due today     The previous urine drug screen was evaluated, and it was compliant for the medications that has been prescribed. A presumptive urine drug screen was done today to rapidly obtain and integrate results into clinical assessment and decision-making for ongoing safe prescribing of controlled substances. The results of the presumptive UDS done last visit was positive for opiates. he is prescribed hydrocodone. Because presumptive UDS positive results are not definitive due to sensitivity and specificity and cross reactivity limitations and negative results do not necessarily indicate absence of drugs or substances in the urine specimen, confirmation will identify specific prescribed and non-prescribed medications or illicit use for ongoing safe prescribing of controlled substances including benzodiazepines, opioid agonist, opioids antagonist, partial agonist, stimulants, muscle relaxers, antidepressants, sleep aids, anti-seizure medicine, and alcohol. Urine drug analysis is used to assist with diagnosis and therapeutic decision-making concerning pretreatment assessment. Intensity and frequency of monitoring with urine drug testing will be based on the risk stratification method in determining risk level for  opioid addiction.         Meds: Percocet 7.5 mg and  Butrans patch 15 mcg -- due 2025; requests a change in meds and states that meds help with pain; no misuse of meds and no opioid abuse;  reviewed and is appropriate; discussed the CDC guidelines and voiced understanding; he is currently only taking 22.5 mg of morphine equivalent meds/day    Nursing:  Pain Medication/Dose/Last Taken/# Taken  Norco 7.5-325    -with meds 0/10    -without meds 8/10    Allergies:  Allergies Reviewed - 24 8:23:11 AM CST  NSAIDs  Current Medications:  Medications List Reviewed (24 8:22:49 AM CST)  Percocet Oral Tablet  MG (2024) Take 1 tablet three times a day as needed for 30 day(s)  hydroCHLOROthiazide Oral Capsule 12.5 MG (5/15/2023) Take 1 capsule once a day  tiZANidine HCl Oral Tablet 4 MG (10/2/2024) Take 1 tablet three times a day for 30 day(s)  Xanax Oral Tablet 0.5 MG (2019) Take 1 tablet once a day as needed  Buprenorphine Transdermal Patch Weekly 15 MCG/HR (2024) Take 1 patch once a week for 4 week(s)  Previous Studies:  MRI  Final Report  MR MRI LUMBAR SPINE W/O CONTRAST    Show Printer-Friendly Version   Patient Name: Roberto Rubio  : May-  ID: [id number]([facility])  Study Date: Aug- 11:08        Studies- MRI LUMBAR SPINE W/O CONTRAST Indications- Back pain. The radiation both legs worse on left. Comparison- MRI lumbar spine 2014. Technique- Using a 1.5 Richa magnet, multisequence multiplanar MR imaging of the lumbar spine was performed without administration of intravenous contrast. Findings- Alignment of lumbar vertebral bodies appears within normal limits. Diffusely low T1 signal is noted throughout the bone marrow of the vertebral bodies. The signal appears isointense to the intervertebral discs slightly hyperintense compared to the intervertebral discs and is stable compared to prior study. This could reflect red marrow conversion as can be seen with  smokers as well as chronic anemic states. Conus appears within normal limits. Intrathecal nerve roots appear within normal limits. Intervertebral disc spaces are fairly well maintained throughout the lumbar spine. No significant desiccation is suggested. At L1-L2, there is no evidence of significant central or neuroforaminal stenosis. At L2-L3, there is no evidence of significant central or neuroforaminal stenosis. At L3-L4, there is no evidence of significant central or neuroforaminal stenosis. Minimal bilateral facet arthropathy is present. At L4-L5, there is no evidence of significant central or neuroforaminal stenosis. Minimal bilateral facet arthropathy is demonstrated. At L5-S1, small broad-based posterior disc bulge is present. Additional minimal bilateral facet arthropathy is demonstrated. No significant central and mild bilateral neuroforaminal stenosis is suggested. Focal fluid signal underlying annular fibers suggests tearing of annular fibers. Intra-abdominal contents demonstrate no evidence of significant pathology. Paraspinous musculature is bilaterally symmetric. Conclusion- 1. Mild bilateral neuroforaminal stenosis is present at L5-S1. At L5-S1 there is additional focal fluid signal underlying posterior annular fibers suggesting a tear of the annular fibers. 2. Other findings as detailed. 2017 10-48 AM Ordering physician-BRIGIDA LAN [facility] Point of service- [facility]. This report has been electronically signed by Jordyn Lopez - SHIRA Dillard Radiologist- JORDYN LOPEZ II, M.D. Releasing Radiologist- JORDYN LOPEZ II, M.D. Released Date Time- 17 1048 ------------------------------------------------------------------------------  ; X-RAY  Final Report  CR CR HIPS BILATERAL    Show Printer-Friendly Version with Image (1 of 1)  Show Printer-Friendly Version without images   Patient Name: Roberto Rubio  : May-  ID: [id number]([facility])  Study  Date: Sept- 13:05        2 VIEWS OF THE BILATERAL HIPS WERE OBTAINED. INDICATION- Pain COMPARISON- None FINDINGS- No acute fracture or subluxation. No lytic or blastic lesion. There mild bilateral, left slightly greater than right, hip osteoarthritis. This is a examples provided by the acetabular roof osteophytosis and mild subchondral sclerosis. Subtle increased osseous prominence of the lateral left femoral head neck junction suggesting changes of a mild/early CAM type impingement. The soft tissues are within normal limits. IMPRESSION- As above. This report has been electronically signed by Victor M Morelos MD - [employer] [place] Reading Physician- VICTOR M MORELOS M.D. Releasing Physician- VICTOR M MORELOS M.D. Released Date Time- 09/13/19 1351 ------------------------------------------------------------------------------  Past Medical History:  The patient has a past medical history of  Hypertension.  There is no past medical history of  Chronic Obstructive Pulmonary Disease, Depression, Cardiovascular Disease, Anxiety, Terminal Illness.  Surgical History:  Bilateral Carpel Tunnel  Social History:  Smoking Status: Never smoker; Last Reviewed: 12/17/2024  Review of Systems:  General:  Patient denies  sweats, fatigue, fever, chills.  Eyes:  Patient denies  blurred vision.  Ears, Nose and Throat:  Patient denies  ringing in the ears, difficulty swallowing.  Cardiovascular:  Patient denies  chest pain, palpitations.  Respiratory:  Patient denies  shortness of breath.  Gastrointestinal:   Patient admits to   nausea.  Patient denies  vomiting, diarrhea, constipation.  Genitourinary:  Patient denies  urinary frequency, burning on urination.  Endocrine:  Patient denies  thyroid problems.  Hematologic:  Patient denies  bleeding tendencies, easy bruising tendency.  Musculoskeletal:  Patient denies  joint pain, walking aids.  Neurologic  Patient denies  seizures, headache.  Psychologic:  Patient denies  anxiety, panic  attacks, depression.  Skin:  Patient denies  pruritus, skin rash.  DEPRESSION SCREENING:  Not at all the patient reports little interest or pleasure in doing things.  Not at all the patient reports feeling down, depressed, or hopeless.  Date Depression Screening Last Done: 03/05/2020  PHQ-2 Score: 0; PHQ-9 Score: incomplete  Vital Signs:  Weight 226 lbs; Height 5 ft 10 in; BMI 32.4  12/17/2024 8:21 AM (CST)  Respiration Rate 18; Pulse Rate 95 bpm; Blood Pressure 182 / 105 mm/Hg; Pain Level: 7  12/17/2024 8:21 AM (CST)  Temperature 97.8 °F  Physical Examination:  Pre Anesthesia evaluation  Pre Op dx: lumbar spondylosis  Planned procedure: Bilateral l2-L4 RFTC  Age: 44  Ht: 5 ft 3 in  Wt: 199 lbs  BMI: 27.8  Allergies: NSAIDS  Meds/Labs/Test  Prior Surgeries: bilateral carpal tunnel surgery,  Anethesia complications: none     Medical History  CNS: __Neg.   __Seizures  __CVA  __TIA  __HA  _X_Depression  Cardiac: _X_Neg  __CAD  __Stents  __MI  __HTN  __CHF  Pulmonary: _X_Neg  __COPD  __Asthma  __Sleep Apnea  __Smoker PPD  __CPAP  GI:_X_Neg.  __Reflux  __Liver Dysfunction  __Hepatitis  __Hiatal Hernia  __Hepatitis  __ETOH  __GERD   Renal:  _X_Neg.  __CRI  __ESRD  Endocrine:  _X_Neg.  __Thyroid  __Diabetes  Heme:  _X_Neg.   __Blood thinners  __other     Back Motion:  Lumbar / lumbosacral spine abnormal.  Tenderness on Palpation:  Lumbosacral Spine:  There is tenderness on palpation of the  right sciatic notch moderate in nature moderate to severe.  Additional Physical Findings:  General general appearance normal  Head normal head exam  Neck normal neck exam  Eyes normal eye exam  Chest normal chest exam  Respiratory normal respiratory exam  Musculoskeletal abnormal low back pain,   Tenderness on palpation, muscle tenderness  Neurologic normal neurologic exam  Skin normal skin exam  Toxicology Report  Toxicology was performed.  Reason for Toxicology:  A presumptive urine drug screen was done today to rapidly obtain and  integrate results into clinical assessment and decision-making for ongoing safe prescribing of controlled substances.  Test Date/Time: 12/17/2024 00:00  Tested By: TAL  Oxycodone  (OXY): Result = Positive; Control = Positive  Morphine  (OPI): Result = Positive; Control = Positive  Amphetamines  (AMP): Result = Negative; Control = Positive  Oxazepam  (BZO): Result = Negative; Control = Positive  Methadone  (MTD): Result = Negative; Control = Positive  Secobarbital  (BAR): Result = Negative; Control = Positive  Tricyclic Antidepressants  (TCA): Result = Negative; Control = Positive  Nortriptyline  (TCA): Result = Negative; Control = Positive  Marijuana-Carboxy Tetrahydrocannabinoid   (THC): Result = Negative; Control = Positive  Cocaine  (JOSH): Result = Negative; Control = Positive  Ecstasy-Methylenedioxymethamphetamine  (MDMA): Result = Negative; Control = Positive  D Methamphetamine  (MET): Result = Negative; Control = Positive  Phencyclidine  (PCP): Result = Negative; Control = Positive  Adulterants  (OX, SG, pH): Result = Negative; Control = Positive  Assessment:  (M54.5) - Low back pain  (M53.3) - Disorder of sacrum  (M79.605) - Pain of left leg  (M54.16) - Lumbar radiculopathy  (M47.27) - Other spondylosis with radiculopathy, lumbosacral region  (M47.817) - Lumbosacral spondylosis  (M53.3) - Sacrococcygeal disorders, not elsewhere classified  (M79.604) - Pain of right leg  Plan:  Follow up visit 3 months  -change to Percocet 10  mg and Butrans 10 with 2 RF-  due 01/11/2025    -gave rx Feb with hold date Feb with hold date 02/10/2025     -refilled rx Zanaflex to [facility] with 5 RF  (due again in March 2024)    -drink plenty of fluids and water    -increase fiber in diet    -we have cardiac clearance Dr. Patterson for cardiac clearance at [facility] heart [facility] vascular [phone])    -scheduled  Bilateral L4-S1 FI#2 at [facility] on 01/22/2025 at 8:30 am     -will schedule RFTC prior to April Monitored  Anesthesia Care medical necessity authorization request:   Monitor anesthesia request is medically indicated for the scheduled _Bilateral L4-S1 FI#2_______procedure due to:     - needle phobia and anxiety, placing the patient at risk during the provided service._YES___  - patient has a BMI greater than 45 ____  - patient has severe sleep apnea for which BiPAP and oxygen are needed while sleeping._____  - patient is unable to follow simple commands due to mental state.____  - patient has an ASA class greater than 3 and requires constant presence of an anesthesiologist/CRNA during the procedure.____  - patient has severe problems with muscles and muscle spasticity that makes it hard to lie still. ____  - patient suffers from chronic pain and is unable to function due to diminished ADL's._YES___  - patient is dependent on opioids or sedatives. _YES___   - Other __YES__      Patient will be scheduled for facet joint injections of the lumbar spine. We have discussed the need for two diagnostically sound procedures before the radiofrequency ablation of the medial branch nerves can be scheduled. Pt has had 2 (+) diagnostic blocks schedule for Bilateral L2-5 RFTC.     Patient will be scheduled for facet joint injections of the lumbar spine. We have discussed the need for two diagnostically sound procedures before the radiofrequency ablation of the medial branch nerves can be scheduled. Diagnostic blocks of the Lumbar facet nerves, levels @ L-4,L-5,and S1, by blocking these medial branch nerves it will effectively block the lumbar facet joints, @ Levels L4-5 and L5-S1   Indications for this procedure for this specific patient include the following:   - Pt has had symptoms for three months with moderate to severe pain with functional impairment rated of  /10 pain.   - Pain non-responsive to conservative care.  - Pain predominately axial and not associated with radiculopathy or claudication.  - No non-facet pathology as source  of pain.  - Clinical assessment implicaties facet joint as putative pain source.   - Pain is exacerbated by extension or prolonged sitting/standing and relieved by rest.   - No unexplained neurologic deficit.   - No history of coagulopathy , infection or unstable medical conditions.  - Pain is causing significant functional limitation resulting in diminished quaility of life and impaired age appropriate ADL's.  - Repeat injections not done prior to 7 days.  - No more than 2 levels will be done per side.      NSAIDS Failure_YES____  Pain for 3 months or >__YeS___  Pain level 6> intermittent or continuous_YES___  Physical exam with documented signs that facets are the primary source of pain__YES__            NARCOTIC STATEMENT  Patient is taking the narcotic pain medications as prescribed. Refill is being given because of the benefit to the patient in regards to the pain. Patient has agreed not to abuse of medication and not to take it more than what is prescribed. The nature of the drug including the potential for addiction and dependency and abuse was also discussed with the patient. Patient has developed physical dependency for the narcotic pain medication for his pain relief.  Patient has also developed tolerance to the sedative effect of the narcotic pain medications.  Patient has decided to continue with these medications despite potential for addiction as described by this office.  This was stressed to the patient that it is the patient's responsibility to secure the narcotic medication and in any event of loss for any reason whatsoever,  there will be no refill before the next due date. Patient also understands that they are not supposed to drive or work on machinery while taking these medications.  Also explained to the patient that in the event of traffic citation, the presence of this drug in  bloodstream may result in DUI.  Patient has been advised not to drink alcohol while taking this  medication.  Patient has verbalized understanding of our office policy and has signed a contract with us in this regard.    Prescriptions Written Today:  Percocet Oral Tablet  MG  Take 1 tablet three times a day as needed for 30 day(s)  Refills: No Refills  Rx quantity: 90  Take 1 tablet three times a day as needed for 30 day(s)  Refills: No Refills  Rx quantity: 90,  Buprenorphine Transdermal Patch Weekly 15 MCG/HR  Take 1 patch once a week for 4 week(s)  Refills: 5  Rx quantity: 4      Provider Electronic Signature  So [place]   Electronically signed: 12/17/2024 1:59:18 PM  Provider Electronic Signature  Ricardo Boss MD  Electronically signed: 12/30/2024 7:29:34 AM        Chief Complaint:      HPI:       Assessment/Plan:         Ricardo Boss MD  Pain Management  [facility] - Pain Management

## 2025-01-22 NOTE — ANESTHESIA POSTPROCEDURE EVALUATION
Anesthesia Post Evaluation    Patient: Roberto Rubio    Procedure(s) Performed: Procedure(s) (LRB):  BLOCK, NERVE, FACET JOINT, LUMBAR, MEDIAL BRANCH (Bilateral)    Final Anesthesia Type: general      Patient location: [facility].  Patient participation: Yes- Able to Participate  Level of consciousness: awake and alert  Post-procedure vital signs: reviewed and stable  Pain management: adequate  Airway patency: patent    PONV status at discharge: No PONV  Anesthetic complications: no      Cardiovascular status: blood pressure returned to baseline, hemodynamically stable and stable  Respiratory status: unassisted  Hydration status: euvolemic  Follow-up not needed.  Comments: Pt voices appreciation for care              Vitals Value Taken Time       No case tracking events are documented in the log.      Pain/Mejia Score: No data recorded

## 2025-01-22 NOTE — OP NOTE
01/22/2025  Roberto Rubio     PREOPERATIVE DIAGNOSIS:     Lumbar Spondylosis without Myelopathy                                                                  POSTOPERATIVE DIAGNOSIS:   Lumbar Spondylosis without Myelopathy                                                                  PROCEDURE:  L4-5 and L5-S1 Bilateral  Lumbar Facet Injections under Fluoroscopic Guidance        SURGEON: Dr. Ricardo Boss  COMPLICATIONS:  None                            DRAINS AND PACKS:  None  ANESTHESIA:  MAC                                    BLOOD LOSS:  None     The patient was identified in the holding area.  The risk and benefits were again explained to the patient.  The patient agreed and consent obtained.   The site was marked with a skin pen.  The patient was taken to the procedure room and placed in the prone position on the C-Arm table.  All pressure points were checked and padded comfortably while the patient was awake.  The patients back was prepped and draped in the usual sterile fashion.  Anesthesia was initiated.   The patients facet joints at  L4-5 and L5-S1 were identified under direct fluoroscopic guidance.  A skin wheal was raised over each of the targeted areas with 0.25% Bupivacaine (2.5mg/ml) 1cc.  A 22 gauge 3 ½ inch needle was advanced into the interarticular surface of each of those levels on the left side.  Then 1.5 milliliters of a solution that contained  [id number]/ml 1/2ml diluted into 9 milliliters of 0.25% Bupivacaine (2.5mg/ml) was injected at each level .  The needles were removed with its tip intact.  The procedure was repeated on the right side as described above. There was adequate hemostasis at the conclusion of the procedure.  The patient tolerated the procedure well with no adverse events.  There was no paresthesia with needle placement or injection.  The patient was taken in stable condition to the holding area and was monitored for the appropriate time of convalescence and  discharged to the care of the patients .      Preoperative pain was    5/10  Postoperative pain was   /10.Pipa   %

## 2025-01-22 NOTE — PLAN OF CARE
Plan:  D/c pt via wheelchair at 0945  Informed pt if does not void in 8 hours to go to ER. Notify if redness, drainage, from injection site or fever over next 3-4 days. Rest and drink plenty of fluids for the remainder of the day. No lifting over 5 lbs. For the remainder of the day. Continue regular medications as prescribed. May take pain medications as prescribed.     Pain improved 100%  Pre-procedure pain: 5  Post-procedure pain: 0

## 2025-01-22 NOTE — TRANSFER OF CARE
"Anesthesia Transfer of Care Note    Patient: Roberto Rubio    Procedure(s) Performed: Procedure(s) (LRB):  BLOCK, NERVE, FACET JOINT, LUMBAR, MEDIAL BRANCH (Bilateral)    Patient location: PACU    Anesthesia Type: general    Transport from OR: Transported from OR on 100% O2 by closed face mask with adequate spontaneous ventilation    Post pain: adequate analgesia    Post assessment: no apparent anesthetic complications    Post vital signs: stable    Level of consciousness: responds to stimulation    Nausea/Vomiting: no nausea/vomiting    Complications: none    Transfer of care protocol was followedComments: Good SV continue, NAD noted, VSS, RTRN      Last vitals: Visit Vitals  /77 (BP Location: Right arm, Patient Position: Lying)   Pulse 87   Temp 37 °C (98.6 °F) (Oral)   Resp 18   Ht 5' 10" (1.778 m)   Wt 105.2 kg (232 lb)   SpO2 95%   BMI 33.29 kg/m²     "

## 2025-01-22 NOTE — ANESTHESIA PREPROCEDURE EVALUATION
01/22/2025  Roberto Rubio is a 49 y.o., male.      Pre-op Assessment    I have reviewed the Patient Summary Reports.     I have reviewed the Nursing Notes. I have reviewed the NPO Status.   I have reviewed the Medications.     Review of Systems  Cardiovascular:     Hypertension                                          Musculoskeletal:  Arthritis          Spine Disorders: lumbar Chronic Pain           Psych:  Psychiatric History                  Physical Exam  General: Well nourished, Cooperative, Alert and Oriented    Airway:  Mallampati: II   Mouth Opening: Normal  TM Distance: Normal  Tongue: Normal  Neck ROM: Normal ROM        Anesthesia Plan  Type of Anesthesia, risks & benefits discussed:    Anesthesia Type: Gen Natural Airway  Intra-op Monitoring Plan: Standard ASA Monitors  Post Op Pain Control Plan: multimodal analgesia  Induction:  IV  Airway Plan: , Awake  Informed Consent: Informed consent signed with the Patient and all parties understand the risks and agree with anesthesia plan.  All questions answered. Patient consented to blood products? Yes  ASA Score: 3  Day of Surgery Review of History & Physical: H&P Update referred to the surgeon/provider.    Ready For Surgery From Anesthesia Perspective.     .

## 2025-01-22 NOTE — DISCHARGE SUMMARY
Patient underwent    L4-5 and L5-S1 Bilateral  Lumbar Facet Injections under Fluoroscopic Guidance   procedure 01/22/2025. The pt will follow up in clinic. Discharged home. Discharge Dx:  Lumbar Spondylosis without Myelopathy

## 2025-04-09 ENCOUNTER — HOSPITAL ENCOUNTER (OUTPATIENT)
Facility: HOSPITAL | Age: 50
Discharge: HOME | End: 2025-04-09
Attending: ANESTHESIOLOGY | Admitting: ANESTHESIOLOGY
Payer: COMMERCIAL

## 2025-04-09 ENCOUNTER — ANESTHESIA (OUTPATIENT)
Dept: PAIN MEDICINE | Facility: HOSPITAL | Age: 50
End: 2025-04-09
Payer: COMMERCIAL

## 2025-04-09 ENCOUNTER — ANESTHESIA EVENT (OUTPATIENT)
Dept: PAIN MEDICINE | Facility: HOSPITAL | Age: 50
End: 2025-04-09
Payer: COMMERCIAL

## 2025-04-09 VITALS
WEIGHT: 221.38 LBS | HEIGHT: 70 IN | TEMPERATURE: 98 F | RESPIRATION RATE: 18 BRPM | BODY MASS INDEX: 31.69 KG/M2 | DIASTOLIC BLOOD PRESSURE: 92 MMHG | SYSTOLIC BLOOD PRESSURE: 136 MMHG | OXYGEN SATURATION: 97 % | HEART RATE: 75 BPM

## 2025-04-09 DIAGNOSIS — M47.816 LUMBAR SPONDYLOSIS: ICD-10-CM

## 2025-04-09 PROCEDURE — 37000008 HC ANESTHESIA 1ST 15 MINUTES: Performed by: ANESTHESIOLOGY

## 2025-04-09 PROCEDURE — 27000284 HC CANNULA NASAL: Performed by: NURSE ANESTHETIST, CERTIFIED REGISTERED

## 2025-04-09 PROCEDURE — 63600175 PHARM REV CODE 636 W HCPCS: Performed by: NURSE ANESTHETIST, CERTIFIED REGISTERED

## 2025-04-09 PROCEDURE — 63600175 PHARM REV CODE 636 W HCPCS: Performed by: ANESTHESIOLOGY

## 2025-04-09 PROCEDURE — 64635 DESTROY LUMB/SAC FACET JNT: CPT | Mod: 50 | Performed by: ANESTHESIOLOGY

## 2025-04-09 RX ORDER — LIDOCAINE HYDROCHLORIDE 20 MG/ML
INJECTION, SOLUTION EPIDURAL; INFILTRATION; INTRACAUDAL; PERINEURAL
Status: DISCONTINUED | OUTPATIENT
Start: 2025-04-09 | End: 2025-04-09

## 2025-04-09 RX ORDER — TRIAMCINOLONE ACETONIDE 40 MG/ML
INJECTION, SUSPENSION INTRA-ARTICULAR; INTRAMUSCULAR CODE/TRAUMA/SEDATION MEDICATION
Status: DISCONTINUED | OUTPATIENT
Start: 2025-04-09 | End: 2025-04-09 | Stop reason: HOSPADM

## 2025-04-09 RX ORDER — PROPOFOL 10 MG/ML
VIAL (ML) INTRAVENOUS
Status: DISCONTINUED | OUTPATIENT
Start: 2025-04-09 | End: 2025-04-09

## 2025-04-09 RX ORDER — ORPHENADRINE CITRATE 30 MG/ML
INJECTION INTRAMUSCULAR; INTRAVENOUS
Status: DISCONTINUED | OUTPATIENT
Start: 2025-04-09 | End: 2025-04-09

## 2025-04-09 RX ORDER — SODIUM CHLORIDE 9 MG/ML
500 INJECTION, SOLUTION INTRAVENOUS CONTINUOUS
Status: ACTIVE | OUTPATIENT
Start: 2025-04-09

## 2025-04-09 RX ORDER — BUPIVACAINE HYDROCHLORIDE 2.5 MG/ML
INJECTION, SOLUTION INFILTRATION; PERINEURAL CODE/TRAUMA/SEDATION MEDICATION
Status: DISCONTINUED | OUTPATIENT
Start: 2025-04-09 | End: 2025-04-09 | Stop reason: HOSPADM

## 2025-04-09 RX ADMIN — ORPHENADRINE CITRATE 60 MG: 30 INJECTION INTRAMUSCULAR; INTRAVENOUS at 09:04

## 2025-04-09 RX ADMIN — PROPOFOL 50 MG: 10 INJECTION, EMULSION INTRAVENOUS at 09:04

## 2025-04-09 RX ADMIN — PROPOFOL 100 MG: 10 INJECTION, EMULSION INTRAVENOUS at 09:04

## 2025-04-09 RX ADMIN — LIDOCAINE HYDROCHLORIDE 50 MG: 20 INJECTION, SOLUTION EPIDURAL; INFILTRATION; INTRACAUDAL; PERINEURAL at 09:04

## 2025-04-09 NOTE — PLAN OF CARE
Plan:  D/c pt via wheelchair at 1005  Informed pt if does not void in 8 hours to go to ER. Notify if redness, drainage, from injection site or fever over next 3-4 days. Rest and drink plenty of fluids for the remainder of the day. No lifting over 5 lbs. For the remainder of the day. Continue regular medications as prescribed. May take pain medications as prescribed.     Pain improved 100%  Pre-procedure pain: 5  Post-procedure pain: 0

## 2025-04-09 NOTE — DISCHARGE SUMMARY
Patient underwent  Bilateral L4 -L5 Radiofrequency Thermocoagulation of the Medial Branch Nerves  procedure 04/09/2025. The pt will follow up in clinic. Discharged home. Discharge Dx:  Lumbar Spondylosis without Myelopathy

## 2025-04-09 NOTE — ANESTHESIA PREPROCEDURE EVALUATION
04/09/2025  Roberto Rubio is a 49 y.o., male.      Pre-op Assessment    I have reviewed the Patient Summary Reports.     I have reviewed the Nursing Notes. I have reviewed the NPO Status.   I have reviewed the Medications.     Review of Systems  Anesthesia Hx:  No problems with previous Anesthesia                Cardiovascular:     Hypertension                                    Hypertension         Neurological:        Chronic Pain Syndrome                         Endocrine:        Obesity / BMI > 30  Psych:  Psychiatric History  depression                Physical Exam  General: Well nourished, Cooperative, Alert and Oriented    Airway:  Mallampati: II   Mouth Opening: Normal  TM Distance: Normal  Tongue: Normal  Neck ROM: Normal ROM    Dental:  Intact        Anesthesia Plan  Type of Anesthesia, risks & benefits discussed:    Anesthesia Type: Gen Natural Airway, MAC  Intra-op Monitoring Plan: Standard ASA Monitors  Post Op Pain Control Plan: multimodal analgesia  Induction:  IV  Informed Consent: Informed consent signed with the Patient and all parties understand the risks and agree with anesthesia plan.  All questions answered. Patient consented to blood products? Yes  ASA Score: 2  Day of Surgery Review of History & Physical: I have interviewed and examined the patient. I have reviewed the patient's H&P dated: There are no significant changes.     Ready For Surgery From Anesthesia Perspective.     .Problem List[1]   Diagnosis    Lumbar spondylosis    Deviated septum     Past Medical History:   Diagnosis Date    Arthritis     Depression     Hypertension     Malignant melanoma of skin, unspecified     Right interscapular back- 10/2005 0.25mm       Past Surgical History:   Procedure Laterality Date    CARPAL TUNNEL RELEASE Bilateral     INJECTION OF ANESTHETIC AGENT AROUND MEDIAL BRANCH NERVES INNERVATING  LUMBAR FACET JOINT Bilateral 4/24/2024    Procedure: BLOCK, NERVE, FACET JOINT, LUMBAR, MEDIAL BRANCH;  Surgeon: Ricardo Boss MD;  Location: [facility];  Service: Pain Management;  Laterality: Bilateral;  Bilateral L4-S1 FI    INJECTION OF ANESTHETIC AGENT AROUND MEDIAL BRANCH NERVES INNERVATING LUMBAR FACET JOINT Bilateral 1/22/2025    Procedure: BLOCK, NERVE, FACET JOINT, LUMBAR, MEDIAL BRANCH;  Surgeon: Ricardo Boss MD;  Location: [facility];  Service: Pain Management;  Laterality: Bilateral;  Fawad L4-S1 FI    INSERTION OF IMPLANTABLE LOOP RECORDER      NASAL SEPTOPLASTY N/A 01/04/2022    Procedure: SEPTOPLASTY, NOSE;  Surgeon: Amado Maya MD;  Location: [facility];  Service: ENT;  Laterality: N/A;    RADIOFREQUENCY ABLATION OF LUMBAR MEDIAL BRANCH NERVE AT SINGLE LEVEL Bilateral 08/25/2021    Procedure: RADIOFREQUENCY ABLATION, NERVE, SPINAL, LUMBAR, MEDIAL BRANCH, 1 LEVEL;  Surgeon: Ricardo Boss MD;  Location: [facility];  Service: Pain Management;  Laterality: Bilateral;  Bilateral  L3-5 RFTC       No family history on file.    Social History     Socioeconomic History    Marital status: Legally    Tobacco Use    Smoking status: Never    Smokeless tobacco: Current     Types: Chew   Substance and Sexual Activity    Alcohol use: Never    Drug use: Never       Current Medications[2]    Review of patient's allergies indicates:   Allergen Reactions    Nsaids (non-steroidal anti-inflammatory drug)              [1]   Patient Active Problem List  Diagnosis    Lumbar spondylosis    Deviated septum   [2]   No current facility-administered medications for this encounter.

## 2025-04-09 NOTE — TRANSFER OF CARE
"Anesthesia Transfer of Care Note    Patient: Roberto Rubio    Procedure(s) Performed: Procedure(s) (LRB):  RADIOFREQUENCY ABLATION, NERVE, SPINAL, LUMBAR, MEDIAL BRANCH, 1 LEVEL (Bilateral)    Patient location: Other:    Anesthesia Type: MAC    Transport from OR: Transported from OR on room air with adequate spontaneous ventilation    Post pain: adequate analgesia    Post assessment: no apparent anesthetic complications    Post vital signs: stable    Level of consciousness: responds to stimulation    Nausea/Vomiting: no nausea/vomiting    Complications: none    Transfer of care protocol was followed      Last vitals: Visit Vitals  /76   Pulse 91   Temp 36.7 °C (98 °F) (Oral)   Resp 17   Ht 5' 10" (1.778 m)   Wt 100.4 kg (221 lb 6.4 oz)   SpO2 (!) 93%   BMI 31.77 kg/m²     "

## 2025-04-09 NOTE — OP NOTE
04/09/2025  Roberto Rubio 1975      PREOPERATIVE DIAGNOSIS:         Lumbar Spondylosis without Myelopathy                                                              Low Back Pain  POSTOPERATIVE DIAGNOSIS:      Lumbar Spondylosis without Myelopathy                                                              Low Back Pain     PROCEDURE:  Bilateral L4 -L5 Radiofrequency Thermocoagulation of the Medial Branch Nerves     SURGEON: Dr. Ricardo Boss   ANESTHESIA:  MAC              COMPLICATIONS:  None  DRAINS AND PACKS:  None            BLOOD LOSS:  None  The patient was identified in the holding area.  The risks and benefits of the procedure were again explained to the patient and the patient agreed to proceed.  The patient was brought in stable condition to the operating room and placed in the prone position on the C-Arm table.  All pressure points were checked and padded comfortably with the patient awake.  Standard ASA monitors were applied.  The patients back was prepped and draped in the usual sterile fashion.  Time out was completed.  Anesthesia was initiated and a skin wheal was raised over the target areas using  Bupivacaine 0.25% (2.5mg/ml) 1ml on the left side at  L4 and L5.  Under direct fluoroscopic guidance through anesthetized skin a 150 millimeter 10mm 18gage curved active tip radiofrequency thermocoagulation needle was advanced down to the target area at the junction between the superior articular process and transverse process of the corresponding levels.  Stimulation of the medial branch nerve was carried out and was less than 1.0 at all levels and motor was greater than 2.0 at each level.  The patient then has a 1 milliliter allotment of 0.25 % Bupivacaine (2.5mg/ml)  was injected at each level.  The patient then had a lesioning process of 80 degrees celsius for 105 five second times two runs.  The patient then through each cannula received a 1 milliliter allotment of a solution that contained  Kenalog 40mg/ml 1/2 ml diluted in 9 milliliters of 0.25%  Bupivacaine (2.5mg/ml).  The stylettes were removed with the tips intact. The procedure was repeated on the right as described above. There was adequate hemostasis at the conclusion of the procedure. The patient tolerated the procedure well with no adverse events and no complications. The patient was taken in stable condition to the holding area and monitored for the appropriate time of convalescence.  Preoperative pain score was 5/10. Postoperative pain score was    /10.

## 2025-04-09 NOTE — H&P
"[facility] - Pain Management  Pain Management  H&P    Patient Name: Roberto Rubio  MRN: [id number]  Admission Date: 2025  Primary Care Provider: Slava Hall NP    Patient information was obtained from     Subjective:     Principal Problem:  So ZACHDominick Monaco FNP  [address]  [place] [address]  [phone]                   RE: Roberto Rubio      : 1975   Date of Service: 2025   Procedure Follow-Up Bilateral L4-S1 F1 # 2 pre 5 post 0 with   100% relief    Existing Patient           Chief Complaint:   Back pain achy in nature, constant, sharp; located in the lumbar region, bilaterally; aggravated by activity, sitting, bending, lifting; relieved by analgesics, rest; gradual in occurrence; pain rated as 6/10 on the pain scale,  Hip pain right; characterized as sharp pain , shooting pain , aching, instability aggravated by walking standing ; relieved by narcotics, muscle relaxants; gradual in onset.   Patient seated in room 1 with complaints of low back and hips pain, states pain is the same since last visit         History of Present Illness:   What part of the body? low back and hips   Pain level at best 2; Pain level at worst 8; Pain level at present 6; Pain level on average 3   48 y/o WM with complaints of "low back pain"; objective data essentially unchanged from previous visit; he was able to have his #2 injection in  that improved pain by 100% and is still having good pain relief but we will schedule RFTC for April while he is home; states that pain improved by 95% after bilateral L4-S1 FI#1 that lasted for several weeks but needs to have his #2 scheduled and then proceed with RFTC; he was able to have clearance from cardiology prior to last injection after he was worked-up for a syncopal episode in Sept that caused him to pass out; he has had his Bilateral L4-L5 RFTC in 2023 that improved pain by 95%; overall, pain is better with meds and rest; pain is worse with " standing for long periods or with driving long distances and has been unable to take pain meds while at work; he is still taking his Kratom while at work; states that he has had about 95% after Bilateral L2-L5 RFTC in April 2022; his last Hip IA injection was Jan 2021 but states that this has improved since going back to work; states that pain improved by 100% after Bilateral L2-L5 RFTC in Aug 2021; pain is worse with movement at work while walking up and down stairs that is worse to R hip; states that pain has been unchanged from previous visit; he has not been able to take any pain meds while working but he has taken the stairs while at work that has caused more pain but this improves while being at home and not as busy; states that pain improved by 95% after Right Hip IA injection and continues to have pain relief but can tell when he walks more, pain is worse but he is exercising some as well; states that his pain improved by 95% after Right L2-L5 RFTC and also had about 100% after his Left L2-L5 RFTC to help control pain better; states that pain has been better from previous visit and meds are helping with pain; states that pain has improved by 95% after his Bilateral L2-L5 RFTC in Feb 2020; his RFTC in May 2019 lasted for several months with pain relief; states that his last Norco caused diarrhea and upset stomach but now the Percocet is helping much better pain without side effects of medicine; states that pain improved by 100% after his Right SI joint injection and is still doing good with pain control; states that pain has improved by about 100% after his Bilateral L2-L5 RFTC in May 2019; he continues to have pain that radiates down into his leg but this is controlled for now with meds; states that his lower back pain had been better since having his Bilateral L2-L5 RFTC in May 2019 but now pain has returned; he does have pain radiating down into his legs that stops at his knee and also feels like he is  having more R hip pain that describes as an electrical shock; he has had both Bilateral L3-S1 FI injections Oct and Dec 2017 with 100% improvement of pain that lasted for several weeks until he had to go back to work; states that essentially pain has been unchanged since last visit and is only using the Norco as needed and meds are working well; states that pain is worse with sitting for long periods and will cause occasional numbness and tingling to R leg and also has low back pain with movement that radiates into his hips; he has had an L3-S1 Caudal BRITTANI in Oct 2016 with good improvement of pain; also feels like he is having more muscle spasms to his lower back and states that Zanaflex is helping and is requesting a med refill     UDS: consistent x 12; inconsistent x 8 (out of meds due to missing appts) UDS due today   The previous urine drug screen was evaluated, and it was compliant for the medications that has been prescribed. A presumptive urine drug screen was done today to rapidly obtain and integrate results into clinical assessment and decision-making for ongoing safe prescribing of controlled substances. The results of the presumptive UDS done last visit was positive for opiates. he is prescribed hydrocodone. Because presumptive UDS positive results are not definitive due to sensitivity and specificity and cross reactivity limitations and negative results do not necessarily indicate absence of drugs or substances in the urine specimen, confirmation will identify specific prescribed and non-prescribed medications or illicit use for ongoing safe prescribing of controlled substances including benzodiazepines, opioid agonist, opioids antagonist, partial agonist, stimulants, muscle relaxers, antidepressants, sleep aids, anti-seizure medicine, and alcohol. Urine drug analysis is used to assist with diagnosis and therapeutic decision-making concerning pretreatment assessment. Intensity and frequency of monitoring  with urine drug testing will be based on the risk stratification method in determining risk level for opioid addiction.     Meds: Percocet 7.5 mg - due 2025  and  Butrans patch 15 mcg -- due today; requests a change in meds and states that meds help with pain; no misuse of meds and no opioid abuse;  reviewed and is appropriate; discussed the CDC guidelines and voiced understanding; he is currently only taking 22.5 mg of morphine equivalent meds/day      Nursing:   Pain Medication/Dose/Last Taken/# Taken  Norco 7.5-325  -with meds 0/10  -without meds 8/10        Allergies:   NSAIDs       Current Medications:   Percocet Oral Tablet  MG (2025) Take 1 tablet three times a day as needed for 30 day(s)  hydroCHLOROthiazide Oral Capsule 12.5 MG (5/15/2023) Take 1 capsule once a day  tiZANidine HCl Oral Tablet 4 MG (2025) Take 1 tablet three times a day for 30 day(s)  Xanax Oral Tablet 0.5 MG (2019) Take 1 tablet once a day as needed  Buprenorphine Transdermal Patch Weekly 15 MCG/HR (2025) Take 1 patch once a week for 4 week(s)      Previous Studies:  MRI  Final Report  MR MRI LUMBAR SPINE W/O CONTRAST    Show Printer-Friendly Version Patient Name: Roberto Rubio   : May-   ID: [id number]([facility])   Study Date: Aug- 11:08             Studies- MRI LUMBAR SPINE W/O CONTRAST Indications- Back pain. The radiation both legs worse on left. Comparison- MRI lumbar spine 2014. Technique- Using a 1.5 Richa magnet, multisequence multiplanar MR imaging of the lumbar spine was performed without administration of intravenous contrast. Findings- Alignment of lumbar vertebral bodies appears within normal limits. Diffusely low T1 signal is noted throughout the bone marrow of the vertebral bodies. The signal appears isointense to the intervertebral discs slightly hyperintense compared to the intervertebral discs and is stable compared to prior study. This could reflect red marrow  conversion as can be seen with smokers as well as chronic anemic states. Conus appears within normal limits. Intrathecal nerve roots appear within normal limits. Intervertebral disc spaces are fairly well maintained throughout the lumbar spine. No significant desiccation is suggested. At L1-L2, there is no evidence of significant central or neuroforaminal stenosis. At L2-L3, there is no evidence of significant central or neuroforaminal stenosis. At L3-L4, there is no evidence of significant central or neuroforaminal stenosis. Minimal bilateral facet arthropathy is present. At L4-L5, there is no evidence of significant central or neuroforaminal stenosis. Minimal bilateral facet arthropathy is demonstrated. At L5-S1, small broad-based posterior disc bulge is present. Additional minimal bilateral facet arthropathy is demonstrated. No significant central and mild bilateral neuroforaminal stenosis is suggested. Focal fluid signal underlying annular fibers suggests tearing of annular fibers. Intra-abdominal contents demonstrate no evidence of significant pathology. Paraspinous musculature is bilaterally symmetric. Conclusion- 1. Mild bilateral neuroforaminal stenosis is present at L5-S1. At L5-S1 there is additional focal fluid signal underlying posterior annular fibers suggesting a tear of the annular fibers. 2. Other findings as detailed. 2017 10-48 AM Ordering physician-BRIGIDA LAN [facility] Point of service- [facility]. This report has been electronically signed by Jordyn Lopez - SHIRA Dillard Radiologist- JORDYN LOPEZ II, M.D. Releasing Radiologist- JORDYN LOPEZ II, M.D. Released Date Time- 17 1048 ------------------------------------------------------------------------------  ; X-RAY  Final Report  CR CR HIPS BILATERAL    Show Printer-Friendly Version with Image (1 of 1)  Show Printer-Friendly Version without images Patient Name: Roberto Rubio   : May-    ID: [id number]([facility])   Study Date: Sept- 13:05             2 VIEWS OF THE BILATERAL HIPS WERE OBTAINED. INDICATION- Pain COMPARISON- None FINDINGS- No acute fracture or subluxation. No lytic or blastic lesion. There mild bilateral, left slightly greater than right, hip osteoarthritis. This is a examples provided by the acetabular roof osteophytosis and mild subchondral sclerosis. Subtle increased osseous prominence of the lateral left femoral head neck junction suggesting changes of a mild/early CAM type impingement. The soft tissues are within normal limits. IMPRESSION- As above. This report has been electronically signed by Victor M Morelos MD - [place] Reading Physician- VICTOR M MORELOS M.D. Releasing Physician- VICTOR M MORELOS M.D. Released Date Time- 09/13/19 1351 ------------------------------------------------------------------------------   Past Medical History:   The patient has a past medical history of  Hypertension.  There is no past medical history of  Chronic Obstructive Pulmonary Disease, Depression, Cardiovascular Disease, Anxiety, Terminal Illness.      Surgical History:   Bilateral Carpel Tunnel      Social History:      Smoking Status: Never smoker; Last Reviewed: 02/19/2025                                    Review of Systems:   General:  Patient denies  sweats, fatigue, fever, chills.  Eyes:  Patient denies  blurred vision.  Ears, Nose and Throat:  Patient denies  ringing in the ears, difficulty swallowing.  Cardiovascular:  Patient denies  chest pain, palpitations.  Respiratory:  Patient denies  shortness of breath.  Gastrointestinal:   Patient admits to   nausea.  Patient denies  vomiting, diarrhea, constipation.  Genitourinary:  Patient denies  urinary frequency, burning on urination.  Endocrine:  Patient denies  thyroid problems.  Hematologic:  Patient denies  bleeding tendencies, easy bruising tendency.  Musculoskeletal:  Patient denies  joint pain, walking  aids.  Neurologic  Patient denies  seizures, headache.  Psychologic:  Patient denies  anxiety, panic attacks, depression.  Skin:  Patient denies  pruritus, skin rash.       DEPRESSION SCREENING:   Not at all the patient reports little interest or pleasure in doing things.  Not at all the patient reports feeling down, depressed, or hopeless.  Date Depression Screening Last Done: 03/05/2020   PHQ-2 Score: 0; PHQ-9 Score: incomplete      Vital Signs:   Weight 226 lbs; Height 5 ft 10 in; BMI 32.4   02/19/2025 12:47 PM (CST)  Temperature 97.8 °F   02/19/2025 12:53 PM (CST)  Respiration Rate 18; Pulse Rate 112 bpm; Blood Pressure 180 / 115 mm/Hg; Pain Level: 0         Physical Examination:   Pre Anesthesia evaluation  Pre Op dx: lumbar spondylosis  Planned procedure: Bilateral l2-L4 RFTC  Age: 44  Ht: 5 ft 3 in  Wt: 199 lbs  BMI: 27.8  Allergies: NSAIDS  Meds/Labs/Test  Prior Surgeries: bilateral carpal tunnel surgery,  Anethesia complications: none     Medical History  CNS: __Neg.   __Seizures  __CVA  __TIA  __HA  _X_Depression  Cardiac: _X_Neg  __CAD  __Stents  __MI  __HTN  __CHF  Pulmonary: _X_Neg  __COPD  __Asthma  __Sleep Apnea  __Smoker PPD  __CPAP  GI:_X_Neg.  __Reflux  __Liver Dysfunction  __Hepatitis  __Hiatal Hernia  __Hepatitis  __ETOH  __GERD   Renal:  _X_Neg.  __CRI  __ESRD  Endocrine:  _X_Neg.  __Thyroid  __Diabetes  Heme:  _X_Neg.   __Blood thinners  __other      Back Motion:   Lumbar / lumbosacral spine abnormal.      Tenderness on Palpation:   Lumbosacral Spine:  There is tenderness on palpation of the  right sciatic notch moderate in nature moderate to severe.         Additional Physical Findings:  General general appearance normal  Head normal head exam  Neck normal neck exam  Eyes normal eye exam  Chest normal chest exam  Respiratory normal respiratory exam  Musculoskeletal abnormal low back pain,   Tenderness on palpation, muscle tenderness  Neurologic normal neurologic exam  Skin normal skin exam        Toxicology Report   Toxicology was performed.   Reason for Toxicology:  A presumptive urine drug screen was done today to rapidly obtain and integrate results into clinical assessment and decision-making for ongoing safe prescribing of controlled substances.   Test Date/Time: 02/19/2025 00:00   Tested By: TAL   Oxycodone  (OXY): Result = Positive; Control = Positive   Morphine  (OPI): Result = Positive; Control = Positive   Amphetamines  (AMP): Result = Negative; Control = Positive   Oxazepam  (BZO): Result = Negative; Control = Positive   Methadone  (MTD): Result = Negative; Control = Positive   Secobarbital  (BAR): Result = Negative; Control = Positive   Tricyclic Antidepressants  (TCA): Result = Negative; Control = Positive   Nortriptyline  (TCA): Result = Negative; Control = Positive   Marijuana-Carboxy Tetrahydrocannabinoid   (THC): Result = Negative; Control = Positive   Cocaine  (JOSH): Result = Negative; Control = Positive   Ecstasy-Methylenedioxymethamphetamine  (MDMA): Result = Negative; Control = Positive   D Methamphetamine  (MET): Result = Negative; Control = Positive   Phencyclidine  (PCP): Result = Negative; Control = Positive   Adulterants  (OX, SG, pH): Result = Negative; Control = Positive      Assessment:   (M54.5) - Low back pain  (M53.3) - Disorder of sacrum  (M79.605) - Pain of left leg  (M54.16) - Lumbar radiculopathy  (M47.27) - Other spondylosis with radiculopathy, lumbosacral region  (M47.817) - Lumbosacral spondylosis  (M53.3) - Sacrococcygeal disorders, not elsewhere classified  (M79.604) - Pain of right leg      Plan:   Follow up visit 3 months      -change to Percocet 10 mg -- 03/16/2025 and [id number] mg with 2 RF-  due today with 4 RF   -gave rx April and May with hold date 04/15/2025 and 05/15/2025   -refilled rx Zanaflex to [facility] with 5 RF  (due again in Sept 2024) printed out   [email or web address]   -drink plenty of fluids and water  -increase fiber in diet  -we have  cardiac clearance Dr. Patterson for cardiac clearance at [facility] heart [facility] vascular [phone])  -scheduled  Bilateral L4-L5 RFTC at [facility] on 04/09/2025 at 7:30 am      Monitored Anesthesia Care medical necessity authorization request:   Monitor anesthesia request is medically indicated for the scheduled _Bilateral L4-L5 RFTC_______procedure due to:     - needle phobia and anxiety, placing the patient at risk during the provided service._YES___  - patient has a BMI greater than 45 ____  - patient has severe sleep apnea for which BiPAP and oxygen are needed while sleeping._____  - patient is unable to follow simple commands due to mental state.____  - patient has an ASA class greater than 3 and requires constant presence of an anesthesiologist/CRNA during the procedure.____  - patient has severe problems with muscles and muscle spasticity that makes it hard to lie still. ____  - patient suffers from chronic pain and is unable to function due to diminished ADL's._YES___  - patient is dependent on opioids or sedatives. _YES___   - Other __YES__         Patient will be scheduled for facet joint injections of the lumbar spine. We have discussed the need for two diagnostically sound procedures before the radiofrequency ablation of the medial branch nerves can be scheduled. Pt has had 2 (+) diagnostic blocks schedule for Bilateral L4-5 RFTC.     _X___2+ diagnostic MBB with each providing 80% relief of pain  Dates _04/2024__ PIPA _100_% and __01/2025__PIPA_100___%                                                                                      OR  __X__At least 50% improvement in the ability to perform previously painful movements and ADL's.        NARCOTIC STATEMENT  Patient is taking the narcotic pain medications as prescribed. Refill is being given because of the benefit to the patient in regards to the pain. Patient has agreed not to abuse of medication and not to take it more than what is prescribed. The nature of  the drug including the potential for addiction and dependency and abuse was also discussed with the patient. Patient has developed physical dependency for the narcotic pain medication for his pain relief.  Patient has also developed tolerance to the sedative effect of the narcotic pain medications.  Patient has decided to continue with these medications despite potential for addiction as described by this office.  This was stressed to the patient that it is the patient's responsibility to secure the narcotic medication and in any event of loss for any reason whatsoever,  there will be no refill before the next due date. Patient also understands that they are not supposed to drive or work on machinery while taking these medications.  Also explained to the patient that in the event of traffic citation, the presence of this drug in  bloodstream may result in DUI.  Patient has been advised not to drink alcohol while taking this medication.  Patient has verbalized understanding of our office policy and has signed a contract with us in this regard.      Prescriptions Written Today:  Percocet Oral Tablet  MG  Take 1 tablet three times a day as needed for 30 day(s)  Refills: No Refills  Rx quantity: 90  Take 1 tablet three times a day as needed for 30 day(s)  Refills: No Refills  Rx quantity: 90  Take 1 tablet three times a day as needed for 30 day(s)  Refills: No Refills  Rx quantity: 90,  tiZANidine HCl Oral Tablet 4 MG  Take 1 tablet three times a day for 30 day(s)  Refills: 5  Rx quantity: 90,  Buprenorphine Transdermal Patch Weekly 15 MCG/HR  Take 1 patch once a week for 4 week(s)  Refills: 4  Rx quantity: 4                 So Monaco       Electronically signed: 2/19/2025 4:27:18 PM      Ricardo Boss MD      Electronically signed: 2/21/2025 7:45:50 AM                           Chief Complaint:      HPI:       Assessment/Plan:         Ricardo Boss MD  Pain Management  [facility] - Pain  Management

## 2025-04-10 NOTE — ANESTHESIA POSTPROCEDURE EVALUATION
Anesthesia Post Evaluation    Patient: Roberto Rubio    Procedure(s) Performed: Procedure(s) (LRB):  RADIOFREQUENCY ABLATION, NERVE, SPINAL, LUMBAR, MEDIAL BRANCH, 1 LEVEL (Bilateral)    Final Anesthesia Type: MAC      Patient participation: Yes- Able to Participate  Level of consciousness: awake and alert  Post-procedure vital signs: reviewed and stable  Pain management: adequate  Airway patency: patent    PONV status at discharge: No PONV  Anesthetic complications: no      Cardiovascular status: blood pressure returned to baseline, hemodynamically stable and stable  Respiratory status: unassisted  Hydration status: euvolemic  Follow-up not needed.  Comments: See nursing note for post op pain/mejia score.              Vitals Value Taken Time   /92 04/09/25 10:00   Temp 36.7 °C (98 °F) 04/09/25 09:33   Pulse 75 04/09/25 10:00   Resp 18 04/09/25 10:00   SpO2 97 % 04/09/25 10:00         Event Time   Out of Recovery 10:00:00         Pain/Mejia Score: Mejia Score: 10 (4/9/2025 10:00 AM)

## 2025-05-02 ENCOUNTER — HOSPITAL ENCOUNTER (EMERGENCY)
Facility: HOSPITAL | Age: 50
Discharge: HOME | End: 2025-05-03
Attending: EMERGENCY MEDICINE
Payer: COMMERCIAL

## 2025-05-02 DIAGNOSIS — R07.9 CHEST PAIN: Primary | ICD-10-CM

## 2025-05-02 LAB
BASOPHILS # BLD AUTO: 0.05 K/UL (ref 0–0.2)
BASOPHILS NFR BLD AUTO: 0.7 % (ref 0–1)
DIFFERENTIAL METHOD BLD: ABNORMAL
EOSINOPHIL # BLD AUTO: 0.09 K/UL (ref 0–0.5)
EOSINOPHIL NFR BLD AUTO: 1.3 % (ref 1–4)
ERYTHROCYTE [DISTWIDTH] IN BLOOD BY AUTOMATED COUNT: 12.3 % (ref 11.5–14.5)
HCT VFR BLD AUTO: 44.9 % (ref 40–54)
HGB BLD-MCNC: 15.3 G/DL (ref 13.5–18)
IMM GRANULOCYTES # BLD AUTO: 0.02 K/UL (ref 0–0.04)
IMM GRANULOCYTES NFR BLD: 0.3 % (ref 0–0.4)
LACTATE SERPL-SCNC: 1.2 MMOL/L (ref 0.5–2.2)
LYMPHOCYTES # BLD AUTO: 2.36 K/UL (ref 1–4.8)
LYMPHOCYTES NFR BLD AUTO: 33.7 % (ref 27–41)
MCH RBC QN AUTO: 29.8 PG (ref 27–31)
MCHC RBC AUTO-ENTMCNC: 34.1 G/DL (ref 32–36)
MCV RBC AUTO: 87.5 FL (ref 80–96)
MONOCYTES # BLD AUTO: 0.58 K/UL (ref 0–0.8)
MONOCYTES NFR BLD AUTO: 8.3 % (ref 2–6)
MPC BLD CALC-MCNC: 9.8 FL (ref 9.4–12.4)
NEUTROPHILS # BLD AUTO: 3.91 K/UL (ref 1.8–7.7)
NEUTROPHILS NFR BLD AUTO: 55.7 % (ref 53–65)
NRBC # BLD AUTO: 0 X10E3/UL
NRBC, AUTO (.00): 0 %
PLATELET # BLD AUTO: 235 K/UL (ref 150–400)
RBC # BLD AUTO: 5.13 M/UL (ref 4.6–6.2)
WBC # BLD AUTO: 7.01 K/UL (ref 4.5–11)

## 2025-05-02 PROCEDURE — 85025 COMPLETE CBC W/AUTO DIFF WBC: CPT

## 2025-05-02 PROCEDURE — 83605 ASSAY OF LACTIC ACID: CPT

## 2025-05-02 PROCEDURE — 84484 ASSAY OF TROPONIN QUANT: CPT

## 2025-05-02 PROCEDURE — 93005 ELECTROCARDIOGRAM TRACING: CPT

## 2025-05-02 PROCEDURE — 83735 ASSAY OF MAGNESIUM: CPT

## 2025-05-02 PROCEDURE — 63600175 PHARM REV CODE 636 W HCPCS

## 2025-05-02 PROCEDURE — 93010 ELECTROCARDIOGRAM REPORT: CPT | Mod: ,,, | Performed by: INTERNAL MEDICINE

## 2025-05-02 PROCEDURE — 80053 COMPREHEN METABOLIC PANEL: CPT

## 2025-05-02 PROCEDURE — 99285 EMERGENCY DEPT VISIT HI MDM: CPT | Mod: 25

## 2025-05-02 PROCEDURE — 96374 THER/PROPH/DIAG INJ IV PUSH: CPT

## 2025-05-02 RX ORDER — LABETALOL HYDROCHLORIDE 5 MG/ML
10 INJECTION, SOLUTION INTRAVENOUS
Status: DISCONTINUED | OUTPATIENT
Start: 2025-05-02 | End: 2025-05-03

## 2025-05-02 RX ORDER — OXYCODONE AND ACETAMINOPHEN 10; 325 MG/1; MG/1
1 TABLET ORAL EVERY 8 HOURS PRN
COMMUNITY
Start: 2025-04-28

## 2025-05-02 RX ORDER — VALSARTAN 160 MG/1
160 TABLET ORAL DAILY
COMMUNITY
Start: 2025-05-02

## 2025-05-02 RX ORDER — TRAZODONE HYDROCHLORIDE 50 MG/1
50 TABLET ORAL NIGHTLY PRN
COMMUNITY

## 2025-05-02 RX ORDER — HYDROXYZINE HYDROCHLORIDE 25 MG/1
25 TABLET, FILM COATED ORAL 3 TIMES DAILY
COMMUNITY
Start: 2025-05-02

## 2025-05-02 RX ORDER — VENLAFAXINE HYDROCHLORIDE 75 MG/1
75 CAPSULE, EXTENDED RELEASE ORAL DAILY
COMMUNITY
Start: 2025-05-02

## 2025-05-02 RX ORDER — LORAZEPAM 2 MG/ML
1 INJECTION INTRAMUSCULAR
Status: COMPLETED | OUTPATIENT
Start: 2025-05-02 | End: 2025-05-02

## 2025-05-02 RX ADMIN — LORAZEPAM 1 MG: 2 INJECTION INTRAMUSCULAR; INTRAVENOUS at 11:05

## 2025-05-03 VITALS
OXYGEN SATURATION: 96 % | HEART RATE: 76 BPM | BODY MASS INDEX: 30.49 KG/M2 | SYSTOLIC BLOOD PRESSURE: 150 MMHG | HEIGHT: 70 IN | DIASTOLIC BLOOD PRESSURE: 86 MMHG | WEIGHT: 213 LBS | RESPIRATION RATE: 18 BRPM | TEMPERATURE: 98 F

## 2025-05-03 LAB
ALBUMIN SERPL BCP-MCNC: 4.3 G/DL (ref 3.5–5)
ALBUMIN/GLOB SERPL: 1.6 {RATIO}
ALP SERPL-CCNC: 74 U/L (ref 40–150)
ALT SERPL W P-5'-P-CCNC: 18 U/L
ANION GAP SERPL CALCULATED.3IONS-SCNC: 16 MMOL/L (ref 7–16)
AST SERPL W P-5'-P-CCNC: 16 U/L (ref 11–45)
BILIRUB SERPL-MCNC: 0.5 MG/DL
BUN SERPL-MCNC: 18 MG/DL (ref 8–26)
BUN/CREAT SERPL: 20 (ref 6–20)
CALCIUM SERPL-MCNC: 8.6 MG/DL (ref 8.4–10.2)
CHLORIDE SERPL-SCNC: 107 MMOL/L (ref 98–107)
CO2 SERPL-SCNC: 20 MMOL/L (ref 22–29)
CREAT SERPL-MCNC: 0.91 MG/DL (ref 0.72–1.25)
EGFR (NO RACE VARIABLE) (RUSH/TITUS): 102 ML/MIN/1.73M2
GLOBULIN SER-MCNC: 2.7 G/DL (ref 2–4)
GLUCOSE SERPL-MCNC: 110 MG/DL (ref 74–100)
MAGNESIUM SERPL-MCNC: 2.2 MG/DL (ref 1.6–2.6)
POTASSIUM SERPL-SCNC: 3.6 MMOL/L (ref 3.5–5.1)
PROT SERPL-MCNC: 7 G/DL (ref 6.4–8.3)
SODIUM SERPL-SCNC: 139 MMOL/L (ref 136–145)
TROPONIN I SERPL HS-MCNC: <2.7 NG/L
TROPONIN I SERPL HS-MCNC: <2.7 NG/L

## 2025-05-03 PROCEDURE — 25000003 PHARM REV CODE 250: Performed by: EMERGENCY MEDICINE

## 2025-05-03 PROCEDURE — 84484 ASSAY OF TROPONIN QUANT: CPT | Performed by: EMERGENCY MEDICINE

## 2025-05-03 RX ORDER — ASPIRIN 325 MG
325 TABLET ORAL
Status: COMPLETED | OUTPATIENT
Start: 2025-05-03 | End: 2025-05-03

## 2025-05-03 RX ADMIN — ASPIRIN 325 MG ORAL TABLET 325 MG: 325 PILL ORAL at 01:05

## 2025-05-03 NOTE — ED TRIAGE NOTES
Patient in with cc of a dull chest pain and htn, dr cortes in [place] changed his meds from carvedilol today

## 2025-05-03 NOTE — ED PROVIDER NOTES
Encounter Date: 5/2/2025       History     Chief Complaint   Patient presents with    Chest Pain    Hypertension     RM is a 48 y/o  male.    Patient has a PMHx of Anxiety, Arthritis, Depression, and Melanoma.    Patient presents to the ED POV with c/o intermittent chest pain for the past one week. Patient stated that he was sent home from work [place] for medical evaluation. Patient was noted to hypertension on the [place] and prescribed Carvedilol by the work physician prior to his departure home. Patient followed with his pcp today who changed him to Valsartan. Patient stated that he was also given Vistaril to take because his chest pain was giving him anxiety. Patient does endorse some mild nausea without vomiting. Patient's pain does not radiate and is not reproducible upon physical exam. Patient denies any shortness of breath but does endorse some moderate anxiety.     The history is provided by the patient.   Chest Pain  The current episode started several days ago. Chest pain occurs intermittently. The chest pain is unchanged. The pain is associated with exertion. At its most intense, the chest pain is at 10/10. The chest pain is currently at 6/10. The quality of the pain is described as aching and sharp. The pain does not radiate. Primary symptoms include nausea. Pertinent negatives for primary symptoms include no fever, no fatigue, no syncope, no shortness of breath, no cough, no wheezing, no palpitations, no abdominal pain, no vomiting, no dizziness and no altered mental status.   Nausea began today. The nausea is exacerbated by activity.   Hypertension   Associated symptoms include chest pain. Pertinent negatives include no palpitations, no dizziness and no shortness of breath.     Review of patient's allergies indicates:   Allergen Reactions    Nsaids (non-steroidal anti-inflammatory drug)      Past Medical History:   Diagnosis Date    Arthritis     Depression     Essential (primary)  hypertension     Malignant melanoma of skin, unspecified     Right interscapular back- 10/2005 0.25mm     Past Surgical History:   Procedure Laterality Date    CARPAL TUNNEL RELEASE Bilateral     INJECTION OF ANESTHETIC AGENT AROUND MEDIAL BRANCH NERVES INNERVATING LUMBAR FACET JOINT Bilateral 4/24/2024    Procedure: BLOCK, NERVE, FACET JOINT, LUMBAR, MEDIAL BRANCH;  Surgeon: Ricardo Boss MD;  Location: [facility];  Service: Pain Management;  Laterality: Bilateral;  Bilateral L4-S1 FI    INJECTION OF ANESTHETIC AGENT AROUND MEDIAL BRANCH NERVES INNERVATING LUMBAR FACET JOINT Bilateral 1/22/2025    Procedure: BLOCK, NERVE, FACET JOINT, LUMBAR, MEDIAL BRANCH;  Surgeon: Ricardo Boss MD;  Location: [facility];  Service: Pain Management;  Laterality: Bilateral;  Fawad L4-S1 FI    INSERTION OF IMPLANTABLE LOOP RECORDER      NASAL SEPTOPLASTY N/A 01/04/2022    Procedure: SEPTOPLASTY, NOSE;  Surgeon: Amado Maya MD;  Location: [facility] OR;  Service: ENT;  Laterality: N/A;    RADIOFREQUENCY ABLATION OF LUMBAR MEDIAL BRANCH NERVE AT SINGLE LEVEL Bilateral 08/25/2021    Procedure: RADIOFREQUENCY ABLATION, NERVE, SPINAL, LUMBAR, MEDIAL BRANCH, 1 LEVEL;  Surgeon: Ricardo Boss MD;  Location: [facility];  Service: Pain Management;  Laterality: Bilateral;  Bilateral  L3-5 RFTC    RADIOFREQUENCY ABLATION OF LUMBAR MEDIAL BRANCH NERVE AT SINGLE LEVEL Bilateral 4/9/2025    Procedure: RADIOFREQUENCY ABLATION, NERVE, SPINAL, LUMBAR, MEDIAL BRANCH, 1 LEVEL;  Surgeon: Ricardo Boss MD;  Location: [facility];  Service: Pain Management;  Laterality: Bilateral;  Fawad L4-5 RFTC     No family history on file.  Social History[1]  Review of Systems   Constitutional: Negative.  Negative for fatigue and fever.   HENT: Negative.     Eyes: Negative.    Respiratory: Negative.  Negative for cough, shortness of breath and wheezing.    Cardiovascular:  Positive for chest pain. Negative  for palpitations, leg swelling and syncope.   Gastrointestinal:  Positive for nausea. Negative for abdominal pain and vomiting.   Endocrine: Negative.    Genitourinary: Negative.    Musculoskeletal: Negative.    Skin: Negative.    Allergic/Immunologic: Negative.    Neurological:  Negative for dizziness.   Hematological: Negative.    Psychiatric/Behavioral: Negative.         Physical Exam     Initial Vitals [05/02/25 2242]   BP Pulse Resp Temp SpO2   (!) 190/118 105 18 97.6 °F (36.4 °C) 99 %      MAP       --         Physical Exam    Nursing note reviewed.  Constitutional: He appears well-developed and well-nourished. He is cooperative. He appears distressed.   Neck: Trachea normal and phonation normal. Neck supple. No thyroid mass and no thyromegaly present. No stridor present. No tracheal tenderness present. No tracheal deviation present.   Normal range of motion.   Full passive range of motion without pain.     Cardiovascular:  Normal rate, regular rhythm, S1 normal, S2 normal, normal heart sounds, intact distal pulses and normal pulses.               Pulmonary/Chest: Effort normal and breath sounds normal.   Abdominal: Abdomen is soft and flat. Bowel sounds are normal. There is no abdominal tenderness. No hernia.   Musculoskeletal:      Cervical back: Full passive range of motion without pain, normal range of motion and neck supple. No edema, erythema or rigidity. No spinous process tenderness or muscular tenderness. Normal range of motion.     Lymphadenopathy:     He has no cervical adenopathy.     He has no axillary adenopathy.   Neurological: He is alert and oriented to person, place, and time. He has normal strength and normal reflexes. He displays normal reflexes. No cranial nerve deficit or sensory deficit. He displays a negative Romberg sign. GCS eye subscore is 4. GCS verbal subscore is 5. GCS motor subscore is 6.   Skin: Skin is warm, dry and intact. Capillary refill takes less than 2 seconds. No rash  noted.   Psychiatric: His speech is normal and behavior is normal. Judgment and thought content normal. His mood appears anxious. Cognition and memory are normal.         Medical Screening Exam   See Full Note    ED Course   Procedures  Labs Reviewed   COMPREHENSIVE METABOLIC PANEL - Abnormal       Result Value    Sodium 139      Potassium 3.6      Chloride 107      CO2 20 (*)     Anion Gap 16      Glucose 110 (*)     BUN 18      Creatinine 0.91      BUN/Creatinine Ratio 20      Calcium 8.6      Total Protein 7.0      Albumin 4.3      Globulin 2.7      A/G Ratio 1.6      Bilirubin, Total 0.5      Alk Phos 74      ALT 18      AST 16      eGFR 102     CBC WITH DIFFERENTIAL - Abnormal    WBC 7.01      RBC 5.13      Hemoglobin 15.3      Hematocrit 44.9      MCV 87.5      MCH 29.8      MCHC 34.1      RDW 12.3      Platelet Count 235      MPV 9.8      Neutrophils % 55.7      Lymphocytes % 33.7      Monocytes % 8.3 (*)     Eosinophils % 1.3      Basophils % 0.7      Immature Granulocytes % 0.3      nRBC, Auto 0.0      Neutrophils, Abs 3.91      Lymphocytes, Absolute 2.36      Monocytes, Absolute 0.58      Eosinophils, Absolute 0.09      Basophils, Absolute 0.05      Immature Granulocytes, Absolute 0.02      nRBC, Absolute 0.00      Diff Type Auto     TROPONIN I - Normal    Troponin I High Sensitivity <2.7     MAGNESIUM - Normal    Magnesium 2.2     LACTIC ACID, PLASMA - Normal    Lactic Acid 1.2     TROPONIN I - Normal    Troponin I High Sensitivity <2.7     CBC W/ AUTO DIFFERENTIAL    Narrative:     The following orders were created for panel order CBC auto differential.  Procedure                               Abnormality         Status                     ---------                               -----------         ------                     CBC with Differential[[id number]]       Abnormal            Final result                 Please view results for these tests on the individual orders.     EKG Readings:  (Independently Interpreted)   Initial Reading: No STEMI. Rhythm: Normal Sinus Rhythm. Heart Rate: 87. Ectopy: No Ectopy. Conduction: Normal. ST Segments: Normal ST Segments. T Waves: Normal. Axis: Normal.       Imaging Results              X-Ray Chest 1 View (Final result)  Result time 05/03/25 06:51:44      Final result by Justin Schuler MD (05/03/25 06:51:44)                   Impression:      No acute process.      Electronically signed by: Justin Schuler MD  Date:    05/03/2025  Time:    06:51               Narrative:    EXAMINATION:  XR CHEST 1 VIEW    CLINICAL HISTORY:  Chest pain, unspecified    TECHNIQUE:  Single frontal view of the chest was performed.    COMPARISON:  None    FINDINGS:  A cardiac loop recorder is present.  The cardiomediastinal silhouette is within normal limits.  The lungs are well expanded without consolidation or pleural effusion.  Atelectasis is present at the left lung base.                                       Medications   LORazepam injection 1 mg (1 mg Intravenous Given 5/2/25 2309)   aspirin tablet 325 mg (325 mg Oral Given 5/3/25 0125)     Medical Decision Making  Amount and/or Complexity of Data Reviewed  Labs: ordered.  Radiology: ordered.    Risk  OTC drugs.  Prescription drug management.              Attending Attestation:     Physician Attestation Statement for NP/PA:   I personally made/approved the management plan and take responsibility for the patient management.                                     Clinical Impression:   Final diagnoses:  [R07.9] Chest pain (Primary)        ED Disposition Condition    Discharge           ED Prescriptions    None       Follow-up Information       Follow up With Specialties Details Why Contact Info    Slava Hall NP Emergency Medicine, Family Medicine In 3 days For follow-up [address] [facility] [address]  [phone]                   [1]   Social History  Tobacco Use    Smoking status: Never    Smokeless  tobacco: Current     Types: Chew   Substance Use Topics    Alcohol use: Never    Drug use: Never        Shantanu Naqvi, JEREMY  05/03/25 0946

## 2025-05-04 LAB
OHS QRS DURATION: 110 MS
OHS QTC CALCULATION: 371 MS

## 2025-05-05 ENCOUNTER — TELEPHONE (OUTPATIENT)
Dept: EMERGENCY MEDICINE | Facility: HOSPITAL | Age: 50
End: 2025-05-05
Payer: COMMERCIAL

## 2025-07-08 ENCOUNTER — OFFICE VISIT (OUTPATIENT)
Dept: DERMATOLOGY | Facility: CLINIC | Age: 50
End: 2025-07-08
Payer: COMMERCIAL

## 2025-07-08 DIAGNOSIS — L82.1 SK (SEBORRHEIC KERATOSIS): ICD-10-CM

## 2025-07-08 DIAGNOSIS — Z85.820 HISTORY OF MELANOMA: ICD-10-CM

## 2025-07-08 DIAGNOSIS — L91.8 INFLAMED ACROCHORDON: ICD-10-CM

## 2025-07-08 DIAGNOSIS — L57.8 OTHER SKIN CHANGES DUE TO CHRONIC EXPOSURE TO NONIONIZING RADIATION: Primary | ICD-10-CM

## 2025-07-08 PROCEDURE — 88304 TISSUE EXAM BY PATHOLOGIST: CPT | Mod: TC,SUR | Performed by: DERMATOLOGY

## 2025-07-08 PROCEDURE — 88304 TISSUE EXAM BY PATHOLOGIST: CPT | Mod: 26,,, | Performed by: PATHOLOGY

## 2025-07-08 NOTE — PROGRESS NOTES
[facility]   Sadie Rodríguez MD    Patient Name: Roberto Rubio  Patient YOB: 1975   Date of Service: 7/8/25    CC: Full Skin Exam    HPI: Roberto Rubio is a 50 y.o. male presents today for a full skin exam.  Patient was last seen 07/08/2024 and dermatologic history includes melanoma. Patient is concerned today about a lesion located on the groin.  It has been present for 10 month(s). It has not been treated in the past.      Past Medical History:   Diagnosis Date    Arthritis     Depression     Essential (primary) hypertension     Malignant melanoma of skin, unspecified     Right interscapular back- 10/2005 0.25mm     Past Surgical History:   Procedure Laterality Date    CARPAL TUNNEL RELEASE Bilateral     INJECTION OF ANESTHETIC AGENT AROUND MEDIAL BRANCH NERVES INNERVATING LUMBAR FACET JOINT Bilateral 4/24/2024    Procedure: BLOCK, NERVE, FACET JOINT, LUMBAR, MEDIAL BRANCH;  Surgeon: Ricardo Boss MD;  Location: [facility];  Service: Pain Management;  Laterality: Bilateral;  Bilateral L4-S1 FI    INJECTION OF ANESTHETIC AGENT AROUND MEDIAL BRANCH NERVES INNERVATING LUMBAR FACET JOINT Bilateral 1/22/2025    Procedure: BLOCK, NERVE, FACET JOINT, LUMBAR, MEDIAL BRANCH;  Surgeon: Ricardo Boss MD;  Location: [facility];  Service: Pain Management;  Laterality: Bilateral;  Fawad L4-S1 FI    INSERTION OF IMPLANTABLE LOOP RECORDER      NASAL SEPTOPLASTY N/A 01/04/2022    Procedure: SEPTOPLASTY, NOSE;  Surgeon: Amado Maya MD;  Location: [facility] OR;  Service: ENT;  Laterality: N/A;    RADIOFREQUENCY ABLATION OF LUMBAR MEDIAL BRANCH NERVE AT SINGLE LEVEL Bilateral 08/25/2021    Procedure: RADIOFREQUENCY ABLATION, NERVE, SPINAL, LUMBAR, MEDIAL BRANCH, 1 LEVEL;  Surgeon: Ricardo Boss MD;  Location: [facility];  Service: Pain Management;  Laterality: Bilateral;  Bilateral  L3-5 RFTC    RADIOFREQUENCY ABLATION OF LUMBAR MEDIAL BRANCH NERVE AT SINGLE  LEVEL Bilateral 4/9/2025    Procedure: RADIOFREQUENCY ABLATION, NERVE, SPINAL, LUMBAR, MEDIAL BRANCH, 1 LEVEL;  Surgeon: Ricardo Boss MD;  Location: [facility];  Service: Pain Management;  Laterality: Bilateral;  Fawad L4-5 RFTC     Review of patient's allergies indicates:   Allergen Reactions    Nsaids (non-steroidal anti-inflammatory drug)      Current Medications[1]    ROS: A focused review of systems was obtained and negative.     Exam: A full skin exam was performed including scalp, hair, face, neck, chest, back, abdomen, right arm, left arm, right hand, left hand, nails, right leg, and left leg.  All areas examined were normal expect as per below in assessment and plan.  General Appearance of the patient is well developed and well nourished.  Orientation: alert and oriented x 3.  Mood and affect: pleasant.    Assessment:   The primary encounter diagnosis was Other skin changes due to chronic exposure to nonionizing radiation. Diagnoses of SK (seborrheic keratosis), History of melanoma, and Inflamed acrochordon were also pertinent to this visit.    Plan:        Other Skin Changes Due to Chronic Exposure of Nonionizing Radiation (L57.8)    Plan: Monitoring.     Plan: Sunscreen Recommendations.  I recommended a broad spectrum sunscreen with a SPF of 30 or higher. I explained that SPF 30 sunscreens block approximately 97 percent of the  sun's harmful rays. Sunscreens should be applied at least 15 minutes prior to expected sun exposure and then every 2 hours after that as long as  sun exposure continues. If swimming or exercising sunscreen should be reapplied every 45 minutes to an hour after getting wet or sweating. One  ounce, or the equivalent of a shot glass full of sunscreen, is adequate to protect the skin not covered by a bathing suit. I also recommended a lip  balm with a sunscreen as well. Sun protective clothing can be used in lieu of sunscreen but must be worn the entire time you are  exposed to the  sun's rays.    Seborrheic Keratosis (L82.1)  - Stuck-on, warty, greasy brown papule with pseudo-horn cysts scattered on the trunk and extremities    Plan: Counseling.  I counseled the patient regarding the following:  Skin Care: Seborrheic Keratoses are benign. No treatment is necessary.  Expectations: Seborrheic Keratoses are benign warty growths. Patients get more of them as they age    Plan: Reassurance    History of malignant melanoma  - well healed scar with NER  Associated diagnosis: Medical surveillance following completed treatment    Plan: Counseling  I counseled the patient regarding the following:  Skin Care: Patients with a history of melanoma should wear broad spectrum sunscreen and sun protective clothing.  Expectations: Scars from excisional sites of melanoma should be monitored for any recurrences. Monthly self-skin checks should be performed to monitor for any moles that change in size, shape or color, itch burn or bleed.  Contact Office if: Patient notices any new or changing moles, develops constitutional symptoms or develops new lesions within or around the previous melanoma scar.      Irritated Acrochordons (L91.8)   - Pedunculated skin colored papules  distributed on the scrotum.  Associated diagnoses: Cutaneous Inflammation and Pruritus    Plan: Counseling.  I counseled the patient regarding the following:  Skin Care: Skin tags can be removed surgically or with liquid nitrogen.  Expectations: Acrochordons are benign skin growths usually found around the neck or the armpits. Sometimes, they get caught on clothing or  jewelry and get inflamed.    Plan: Skin Tag Removal with Pathology.  Location (A): scrotum . Written consent was obtained and risks were reviewed including but not limited to scarring, infection, bleeding,  scabbing, incomplete removal, nerve damage and allergy to anesthesia. The area was prepped with Alcohol and Chloraprep. Approximately 0.5cc  of 1% lidocaine with  epinephrine was infiltrated. A skin tag removal (with pathology) was performed using a Dermablade. A total of 1 lesion was  removed. This procedure was medically necessary because the lesions that were treated were: irritated and itchy. Drysol was used for hemostasis.   A total of 1 specimen, removed from the right axillary vault, was placed in a specimen jar and sent for H and E. Following the procedure  hemostasis was acheived with drysol and bandages. Patient will be notified of biopsy results. However, patient instructed to call the office if not  contacted within 2 weeks.        Follow up in about 1 year (around 7/8/2026) for fse.    Sadie Rodríguez MD         [1]   Current Outpatient Medications:     buprenorphine 15 mcg/hour PTWK, Apply 1 patch topically every 7 days., Disp: , Rfl:     busPIRone (BUSPAR) 7.5 MG tablet, Take 10 mg by mouth 2 (two) times a day., Disp: , Rfl:     cholecalciferol, vitamin D3, 1,250 mcg (50,000 unit) capsule, Take by mouth once a week., Disp: , Rfl:     hydrOXYzine HCL (ATARAX) 25 MG tablet, Take 25 mg by mouth 3 (three) times daily., Disp: , Rfl:     mupirocin (BACTROBAN) 2 % ointment, Apply topically 3 (three) times daily., Disp: 30 g, Rfl: 6    oxyCODONE-acetaminophen (PERCOCET)  mg per tablet, Take 1 tablet by mouth every 8 (eight) hours as needed for Pain., Disp: , Rfl:     rosuvastatin (CRESTOR) 10 MG tablet, 10 mg every evening., Disp: , Rfl:     sertraline (ZOLOFT) 50 MG tablet, 50 mg once daily., Disp: , Rfl:     testosterone cypionate (DEPOTESTOTERONE CYPIONATE) 100 mg/mL injection, Inject 1,000 mg into the muscle once a week., Disp: , Rfl:     tiZANidine (ZANAFLEX) 4 MG tablet, Take 4 mg by mouth every 8 (eight) hours as needed., Disp: , Rfl:     traZODone (DESYREL) 50 MG tablet, Take 50 mg by mouth nightly as needed for Insomnia., Disp: , Rfl:     valsartan (DIOVAN) 160 MG tablet, Take 160 mg by mouth once daily., Disp: , Rfl:     venlafaxine (EFFEXOR-XR) 75 MG 24 hr  capsule, Take 75 mg by mouth once daily., Disp: , Rfl:   No current facility-administered medications for this visit.    Facility-Administered Medications Ordered in Other Visits:     0.9% NaCl infusion, 500 mL, Intravenous, Continuous, Ricardo Boss MD

## 2025-07-10 LAB
ESTROGEN SERPL-MCNC: NORMAL PG/ML
INSULIN SERPL-ACNC: NORMAL U[IU]/ML
LAB AP GROSS DESCRIPTION: NORMAL
LAB AP LABORATORY NOTES: NORMAL
LAB AP SPEC A DDX: NORMAL
LAB AP SPEC A MORPHOLOGY: NORMAL
LAB AP SPEC A PROCEDURE: NORMAL
T3RU NFR SERPL: NORMAL %

## (undated) DEVICE — KIT IV START RUSH

## (undated) DEVICE — GLOVE SURGICAL PROTEXIS PI SIZE 6.5

## (undated) DEVICE — TRAY NERVE BLOCK UNIV 10/CA

## (undated) DEVICE — GLOVE PROTEXIS PI SYN SURG 7.5

## (undated) DEVICE — KIT IV START

## (undated) DEVICE — KIT IV START 849

## (undated) DEVICE — APPLICATOR CHLORAPREP HI-LITE TINTED ORANGE 26ML

## (undated) DEVICE — SOL CONTINU-FLO SET 2 LAV

## (undated) DEVICE — GLOVE SURGICAL PROTEXIS PI SIZE 7.5

## (undated) DEVICE — GLOVE SENSICARE PI ALOE 8

## (undated) DEVICE — APPLICATOR CHLORAPREP ORN 26ML

## (undated) DEVICE — SET EXT STD BORE CATH 7.6IN

## (undated) DEVICE — CATH INTROCAN SAF 2 IV 22GX1IN

## (undated) DEVICE — SLIPPER FALL PREV YELLOW XLG

## (undated) DEVICE — Device

## (undated) DEVICE — TRAY NERVE BLOCK (KC) PMA

## (undated) DEVICE — SLIPPER FALL PREV YEL BARIAT

## (undated) DEVICE — OXISENSOR ADULT DIGIT N/S

## (undated) DEVICE — DRAPE SURGICAL 3/4 SHEET 52IN X 76IN STERILE

## (undated) DEVICE — CDS ENT

## (undated) DEVICE — SYRINGE 10-12CC LURE -LOK TIP

## (undated) DEVICE — CANNULA STRYKER VENOM 20G, 100MM, 10MM (SHORT)

## (undated) DEVICE — GLOVE SURGICAL PROTEXIS PI SIZE 6

## (undated) DEVICE — NDL SPINAL SPINOCAN 22GX3.5

## (undated) DEVICE — SUTURE CHROMIC 3-0 27 FS-2

## (undated) DEVICE — ELECTRODE REM PLYHSV RETURN 9

## (undated) DEVICE — CATH IV JELCO 22GX1 IN

## (undated) DEVICE — DRAPE THREE-QTR REINF 53X77IN

## (undated) DEVICE — SET IV SOL CONTIN-FLOW 10 DROP/ML (PRIMARY)

## (undated) DEVICE — NDL TUOHY 22G X 3.5

## (undated) DEVICE — GLOVE SENSICARE PI SURG 6.5

## (undated) DEVICE — GLOVE PROTEXIS PI SYN SURG 6.5

## (undated) DEVICE — CATH IV 22G X 1 AUTOGUARD